# Patient Record
Sex: MALE | Race: WHITE | NOT HISPANIC OR LATINO | URBAN - METROPOLITAN AREA
[De-identification: names, ages, dates, MRNs, and addresses within clinical notes are randomized per-mention and may not be internally consistent; named-entity substitution may affect disease eponyms.]

---

## 2017-10-04 ENCOUNTER — INPATIENT (INPATIENT)
Facility: HOSPITAL | Age: 62
LOS: 5 days | Discharge: NURSING, REHAB OR LONG-TERM CARE | DRG: 65 | End: 2017-10-10
Attending: FAMILY MEDICINE | Admitting: HOSPITALIST
Payer: MEDICAID

## 2017-10-04 VITALS — WEIGHT: 196.21 LBS

## 2017-10-04 DIAGNOSIS — I63.9 CEREBRAL INFARCTION, UNSPECIFIED: ICD-10-CM

## 2017-10-04 LAB
ALBUMIN SERPL ELPH-MCNC: 4.6 G/DL — SIGNIFICANT CHANGE UP (ref 3.3–5.2)
ALP SERPL-CCNC: 102 U/L — SIGNIFICANT CHANGE UP (ref 40–120)
ALT FLD-CCNC: 16 U/L — SIGNIFICANT CHANGE UP
ANION GAP SERPL CALC-SCNC: 13 MMOL/L — SIGNIFICANT CHANGE UP (ref 5–17)
APTT BLD: 32.4 SEC — SIGNIFICANT CHANGE UP (ref 27.5–37.4)
AST SERPL-CCNC: 16 U/L — SIGNIFICANT CHANGE UP
BASOPHILS # BLD AUTO: 0 K/UL — SIGNIFICANT CHANGE UP (ref 0–0.2)
BASOPHILS NFR BLD AUTO: 0.2 % — SIGNIFICANT CHANGE UP (ref 0–2)
BILIRUB SERPL-MCNC: 0.2 MG/DL — LOW (ref 0.4–2)
BUN SERPL-MCNC: 12 MG/DL — SIGNIFICANT CHANGE UP (ref 8–20)
CALCIUM SERPL-MCNC: 10 MG/DL — SIGNIFICANT CHANGE UP (ref 8.6–10.2)
CHLORIDE SERPL-SCNC: 100 MMOL/L — SIGNIFICANT CHANGE UP (ref 98–107)
CO2 SERPL-SCNC: 27 MMOL/L — SIGNIFICANT CHANGE UP (ref 22–29)
CREAT SERPL-MCNC: 0.68 MG/DL — SIGNIFICANT CHANGE UP (ref 0.5–1.3)
EOSINOPHIL # BLD AUTO: 0.1 K/UL — SIGNIFICANT CHANGE UP (ref 0–0.5)
EOSINOPHIL NFR BLD AUTO: 1 % — SIGNIFICANT CHANGE UP (ref 0–5)
GLUCOSE SERPL-MCNC: 110 MG/DL — SIGNIFICANT CHANGE UP (ref 70–115)
HCT VFR BLD CALC: 43 % — SIGNIFICANT CHANGE UP (ref 42–52)
HGB BLD-MCNC: 14.9 G/DL — SIGNIFICANT CHANGE UP (ref 14–18)
INR BLD: 0.99 RATIO — SIGNIFICANT CHANGE UP (ref 0.88–1.16)
LYMPHOCYTES # BLD AUTO: 2.7 K/UL — SIGNIFICANT CHANGE UP (ref 1–4.8)
LYMPHOCYTES # BLD AUTO: 22.6 % — SIGNIFICANT CHANGE UP (ref 20–55)
MCHC RBC-ENTMCNC: 30.5 PG — SIGNIFICANT CHANGE UP (ref 27–31)
MCHC RBC-ENTMCNC: 34.7 G/DL — SIGNIFICANT CHANGE UP (ref 32–36)
MCV RBC AUTO: 87.9 FL — SIGNIFICANT CHANGE UP (ref 80–94)
MONOCYTES # BLD AUTO: 0.5 K/UL — SIGNIFICANT CHANGE UP (ref 0–0.8)
MONOCYTES NFR BLD AUTO: 4.1 % — SIGNIFICANT CHANGE UP (ref 3–10)
NEUTROPHILS # BLD AUTO: 8.5 K/UL — HIGH (ref 1.8–8)
NEUTROPHILS NFR BLD AUTO: 71.7 % — SIGNIFICANT CHANGE UP (ref 37–73)
PLATELET # BLD AUTO: 279 K/UL — SIGNIFICANT CHANGE UP (ref 150–400)
POTASSIUM SERPL-MCNC: 4 MMOL/L — SIGNIFICANT CHANGE UP (ref 3.5–5.3)
POTASSIUM SERPL-SCNC: 4 MMOL/L — SIGNIFICANT CHANGE UP (ref 3.5–5.3)
PROT SERPL-MCNC: 7.5 G/DL — SIGNIFICANT CHANGE UP (ref 6.6–8.7)
PROTHROM AB SERPL-ACNC: 10.9 SEC — SIGNIFICANT CHANGE UP (ref 9.8–12.7)
RBC # BLD: 4.89 M/UL — SIGNIFICANT CHANGE UP (ref 4.6–6.2)
RBC # FLD: 13.9 % — SIGNIFICANT CHANGE UP (ref 11–15.6)
SODIUM SERPL-SCNC: 140 MMOL/L — SIGNIFICANT CHANGE UP (ref 135–145)
TROPONIN T SERPL-MCNC: <0.01 NG/ML — SIGNIFICANT CHANGE UP (ref 0–0.06)
WBC # BLD: 11.9 K/UL — HIGH (ref 4.8–10.8)
WBC # FLD AUTO: 11.9 K/UL — HIGH (ref 4.8–10.8)

## 2017-10-04 PROCEDURE — 70496 CT ANGIOGRAPHY HEAD: CPT | Mod: 26

## 2017-10-04 PROCEDURE — 99291 CRITICAL CARE FIRST HOUR: CPT

## 2017-10-04 PROCEDURE — 70498 CT ANGIOGRAPHY NECK: CPT | Mod: 26

## 2017-10-04 PROCEDURE — 70450 CT HEAD/BRAIN W/O DYE: CPT | Mod: 26

## 2017-10-04 PROCEDURE — 93010 ELECTROCARDIOGRAM REPORT: CPT

## 2017-10-04 RX ORDER — ASPIRIN/CALCIUM CARB/MAGNESIUM 324 MG
325 TABLET ORAL ONCE
Qty: 0 | Refills: 0 | Status: COMPLETED | OUTPATIENT
Start: 2017-10-04 | End: 2017-10-04

## 2017-10-04 RX ORDER — ALTEPLASE 100 MG
72 KIT INTRAVENOUS ONCE
Qty: 0 | Refills: 0 | Status: DISCONTINUED | OUTPATIENT
Start: 2017-10-04 | End: 2017-10-04

## 2017-10-04 RX ORDER — LABETALOL HCL 100 MG
20 TABLET ORAL ONCE
Qty: 0 | Refills: 0 | Status: COMPLETED | OUTPATIENT
Start: 2017-10-04 | End: 2017-10-04

## 2017-10-04 RX ORDER — ALTEPLASE 100 MG
8 KIT INTRAVENOUS ONCE
Qty: 0 | Refills: 0 | Status: DISCONTINUED | OUTPATIENT
Start: 2017-10-04 | End: 2017-10-04

## 2017-10-04 RX ADMIN — Medication 20 MILLIGRAM(S): at 18:15

## 2017-10-04 RX ADMIN — Medication 325 MILLIGRAM(S): at 20:11

## 2017-10-04 RX ADMIN — Medication 20 MILLIGRAM(S): at 19:11

## 2017-10-04 NOTE — ED PROVIDER NOTE - OBJECTIVE STATEMENT
63 yo male with pmh of htn and etoh abuse sp recent detox in 4/2017 who presents with left sided weakness unknown exact time of onset but more than 6 hours ago probale 7 hours ago when questioning patient thus patient out of any tp a time frame.  Pt states  he had noticed weakness and numbness to his left leg which progressed to his left arm. Pt notes that if feels like his left sided of his body is asleep. denies fever. denies HA or neck pain. no chest pain or sob. no abd pain. no n/v/d. no urinary f/u/d. no back pain. denies illicit drug use. no recent travel. no rash. no other acute issues symptoms or concerns

## 2017-10-04 NOTE — ED ADULT NURSE NOTE - CHPI ED SYMPTOMS NEG
no blurred vision/no nausea/no vomiting/no dizziness/no loss of consciousness/no confusion/no fever/no change in level of consciousness

## 2017-10-04 NOTE — ED PROVIDER NOTE - CARE PLAN
Principal Discharge DX:	CVA (cerebral vascular accident) Principal Discharge DX:	CVA (cerebral vascular accident)  Secondary Diagnosis:	Hypertensive emergency

## 2017-10-04 NOTE — ED ADULT TRIAGE NOTE - CHIEF COMPLAINT QUOTE
States left leg became numb  at 2pm and arm is not starting to go numb. Dragging leg in triage. No facial asym present.

## 2017-10-04 NOTE — ED PROVIDER NOTE - CRITICAL CARE PROVIDED
direct patient care (not related to procedure)/documentation/interpretation of diagnostic studies/additional history taking

## 2017-10-04 NOTE — ED PROVIDER NOTE - MEDICAL DECISION MAKING DETAILS
pt presented eventually confirming tpa timeframe out of window was code stroke symtpoms more than 6 hours ago cta neg no interventional candidate per code stroke neuro dr amaya nned for emegent tx bp if possible tp acnadidate . need for multiple bedside reassessemnts neurovascular status.

## 2017-10-05 DIAGNOSIS — Z29.9 ENCOUNTER FOR PROPHYLACTIC MEASURES, UNSPECIFIED: ICD-10-CM

## 2017-10-05 DIAGNOSIS — I63.9 CEREBRAL INFARCTION, UNSPECIFIED: ICD-10-CM

## 2017-10-05 DIAGNOSIS — D72.829 ELEVATED WHITE BLOOD CELL COUNT, UNSPECIFIED: ICD-10-CM

## 2017-10-05 DIAGNOSIS — Z98.890 OTHER SPECIFIED POSTPROCEDURAL STATES: Chronic | ICD-10-CM

## 2017-10-05 DIAGNOSIS — I10 ESSENTIAL (PRIMARY) HYPERTENSION: ICD-10-CM

## 2017-10-05 LAB
CHOLEST SERPL-MCNC: 181 MG/DL — SIGNIFICANT CHANGE UP (ref 110–199)
HDLC SERPL-MCNC: 38 MG/DL — LOW
LIPID PNL WITH DIRECT LDL SERPL: 116 MG/DL — SIGNIFICANT CHANGE UP
TOTAL CHOLESTEROL/HDL RATIO MEASUREMENT: 5 RATIO — SIGNIFICANT CHANGE UP (ref 3.4–9.6)
TRIGL SERPL-MCNC: 137 MG/DL — SIGNIFICANT CHANGE UP (ref 10–200)

## 2017-10-05 PROCEDURE — 93880 EXTRACRANIAL BILAT STUDY: CPT | Mod: 26

## 2017-10-05 PROCEDURE — 70551 MRI BRAIN STEM W/O DYE: CPT | Mod: 26

## 2017-10-05 PROCEDURE — 99223 1ST HOSP IP/OBS HIGH 75: CPT

## 2017-10-05 PROCEDURE — 99222 1ST HOSP IP/OBS MODERATE 55: CPT

## 2017-10-05 RX ORDER — ATORVASTATIN CALCIUM 80 MG/1
80 TABLET, FILM COATED ORAL AT BEDTIME
Qty: 0 | Refills: 0 | Status: DISCONTINUED | OUTPATIENT
Start: 2017-10-05 | End: 2017-10-10

## 2017-10-05 RX ORDER — ASPIRIN/CALCIUM CARB/MAGNESIUM 324 MG
325 TABLET ORAL DAILY
Qty: 0 | Refills: 0 | Status: DISCONTINUED | OUTPATIENT
Start: 2017-10-05 | End: 2017-10-10

## 2017-10-05 RX ADMIN — ATORVASTATIN CALCIUM 80 MILLIGRAM(S): 80 TABLET, FILM COATED ORAL at 22:50

## 2017-10-05 RX ADMIN — Medication 325 MILLIGRAM(S): at 10:51

## 2017-10-05 NOTE — H&P ADULT - NSHPLABSRESULTS_GEN_ALL_CORE
LABS:    PT/INR - ( 04 Oct 2017 18:24 )   PT: 10.9 sec;   INR: 0.99 ratio    PTT - ( 04 Oct 2017 18:24 )  PTT:32.4 sec  LIVER FUNCTIONS - ( 04 Oct 2017 18:24 )  Alb: 4.6 g/dL / Pro: 7.5 g/dL / ALK PHOS: 102 U/L / ALT: 16 U/L / AST: 16 U/L / GGT: x                                 14.9   11.9  )-----------( 279      ( 04 Oct 2017 18:24 )             43.0     10-04    140  |  100  |  12.0  ----------------------------<  110  4.0   |  27.0  |  0.68    Ca    10.0      04 Oct 2017 18:24    TPro  7.5  /  Alb  4.6  /  TBili  0.2<L>  /  DBili  x   /  AST  16  /  ALT  16  /  AlkPhos  102  10-04    CARDIAC MARKERS ( 04 Oct 2017 18:24 )  x     / <0.01 ng/mL / x     / x     / x

## 2017-10-05 NOTE — ED ADULT NURSE REASSESSMENT NOTE - GENERAL PATIENT STATE
comfortable appearance/cooperative/family/SO at bedside
comfortable appearance/resting/sleeping
resting/sleeping

## 2017-10-05 NOTE — H&P ADULT - ASSESSMENT
Pt is a 61 yo male with pmh of htn and etoh abuse sp recent detox in 4/2017 who presents with left sided weakness. Pt states that at approx 12pm he had noticed weakness and numbness to his left leg which progressed to his left arm. Pt notes that if feels like his left sided of his body is asleep.  In the ED, pt was noted to be dragging his left foot.     -Left sided hemiparesis, r/o CVA.   Admit to stroke unit 4BRK.   neuro checks.   ct brain with no acute findings, mri ordered  echo ordered  us carotids ordered  neuro consult in am   cw asa   check lipid panel.     -Htn:  has not been on medication for 4 months as he ran out.  allow permissable htn in setting of possible acute cva.     -dvt ppx  lovenox sub q Pt is a 61 yo male with pmh of htn and etoh abuse sp recent detox in 4/2017 who presents with left sided weakness. Pt states that at approx 12pm he had noticed weakness and numbness to his left leg which progressed to his left arm. Pt notes that if feels like his left sided of his body is asleep.  In the ED, pt was noted to be dragging his left foot.     -Left sided hemiparesis, r/o CVA.   Admit to stroke unit 4BRK.   neuro checks.   ct brain with no acute findings, mri ordered  ct angiogram pending  echo ordered  us carotids ordered  neuro consult in am   cw asa   check lipid panel.     -Htn:  has not been on medication for 4 months as he ran out.  allow permissible htn in setting of possible acute cva.     -dvt ppx  lovenox sub q

## 2017-10-05 NOTE — H&P ADULT - NSHPREVIEWOFSYSTEMS_GEN_ALL_CORE
+left sided weakness and numbness  +difficulty walking  no chest pain   no sob  no nausea  no vomiting +left sided weakness and numbness  +difficulty walking  no chest pain   no sob  no nausea  no vomiting  no fever  no cerrato

## 2017-10-05 NOTE — CONSULT NOTE ADULT - ASSESSMENT
This is a 62-year-old gentleman with lacunar stroke. This is likely due to hypertension and smoking. Blood pressure control is advised with permission of hypertension for the next 24 hours. Tobacco cessation was also encouraged.   He will need physical and occupational therapy for the weakness.  ASA, statin   Carotid duplex and echocardiogram should be obtained as well.     I will follow along with you.    Geovanny Merritt MD, PhD   [id number]

## 2017-10-05 NOTE — H&P ADULT - NSHPPHYSICALEXAM_GEN_ALL_CORE
Patient is a 62y old  Male who presents with a chief complaint of left sided weakness and numbness (05 Oct 2017 01:54)  EXAM:  Vital Signs Last 24 Hrs  T(C): 36.9 (04 Oct 2017 20:11), Max: 36.9 (04 Oct 2017 18:12)  T(F): 98.4 (04 Oct 2017 20:11), Max: 98.5 (04 Oct 2017 18:12)  HR: 74 (04 Oct 2017 23:30) (70 - 95)  BP: 115/60 (04 Oct 2017 23:30) (115/60 - 214/106)  BP(mean): --  RR: 17 (04 Oct 2017 23:30) (16 - 19)  SpO2: 94% (04 Oct 2017 23:30) (94% - 100%)    10-04 @ 07:01  -  10-05 @ 04:15  --------------------------------------------------------  IN: 0 mL / OUT: 200 mL / NET: -200 mL        GENERAL NAD,   HEENT: NORMAL CEPHALIC ATRAUMATIC, EOMI, MOIST MUCUS MEMBRANES  NECK SUPPLE  CVS S1S2, RRR,   RESP BLCTA, NO RHONCHI  ABD SOFT, NON TENDER, NON DISTENDED  EXT NO EDEMA  NEURO MOVES ALL 4 EXTREMITES, LEFT SIDED HEMIPARESIS  TO 3/5. DECREASED SENSATION TO LEFT UPPER AND LOWER EXTREMITY. ABNORMAL FINGER TO NOSE TEST.   PSYCH APPROPRIATE MOOD  SKIN WARM, DRY

## 2017-10-05 NOTE — CONSULT NOTE ADULT - SUBJECTIVE AND OBJECTIVE BOX
[facility]                                     Neurology at [place]                                 Shaina Luna, & Roderick                                  [address]                                        [place], [place], [address]                                             [phone]    HISTORY:    This is a 62-year-old gentleman who came in to the hospital with complaints of left-sided weakness. Started yesterday. He presented on the time frame for TPA. The weakness albeit slightly better persists today. He did not complain of any numbness. The weakness is involving the arm and leg primarily with minimal facial involvement. Neurology evaluation is requested.    PAST MEDICAL & SURGICAL HISTORY:  HTN (hypertension)  H/O knee surgery  H/O left inguinal hernia repair      MEDICATIONS  (STANDING):  aspirin 325 milliGRAM(s) Oral daily  atorvastatin 80 milliGRAM(s) Oral at bedtime    MEDICATIONS  (PRN):      Allergies    No Known Allergies    Intolerances    None reported    SOCIAL HISTORY:  smoker, no alcohol use sober 5 months, no drug use    FAMILY HISTORY:  Family history of lung cancer (Father, Sibling)      ROS:  left-sided weakness  The patient denies fevers or weight changes.  Denies headache or dizziness.  Denies chest pain.  Denies shortness of breath.  Denies abdominal pain, nausea, or vomiting.  Denies change in urinary pattern.  Denies rash.  Denies recent mood changes.    Exam:  Vital Signs Last 24 Hrs  T(C): 36.6 (05 Oct 2017 10:59), Max: 37.1 (05 Oct 2017 08:04)  T(F): 97.9 (05 Oct 2017 10:59), Max: 98.7 (05 Oct 2017 08:04)  HR: 68 (05 Oct 2017 10:59) (68 - 95)  BP: 142/77 (05 Oct 2017 10:59) (115/60 - 214/106)  BP(mean): --  RR: 16 (05 Oct 2017 10:59) (16 - 20)  SpO2: 95% (05 Oct 2017 10:59) (94% - 100%)  General: NAD    Mental status: The patient is awake, alert, and fully oriented. There is no aphasia.    Cranial nerves: . Pupils react Symmetrically to light. There is no visual field deficit to confrontation. Extraocular motion is full with no nystagmus. There is no ptosis. Facial sensation is intact. Facial musculature is symmetric. Palate elevates symmetrically. Tongue is midline.    Motor: There is normal bulk and tone.  Strength is 5/5 in the right arm and leg.   Strength is 3-4/5 in the left arm and leg.    Sensation: Intact to light touch and pin.    Reflexes: 1-2+ throughout and plantar responses are flexor.    Cerebellar: There is no dysmetria on finger to nose testing.    LABS:                         14.9   11.9  )-----------( 279      ( 04 Oct 2017 18:24 )             43.0       10-04    140  |  100  |  12.0  ----------------------------<  110  4.0   |  27.0  |  0.68    Ca    10.0      04 Oct 2017 18:24    TPro  7.5  /  Alb  4.6  /  TBili  0.2<L>  /  DBili  x   /  AST  16  /  ALT  16  /  AlkPhos  102  10-04      PT/INR - ( 04 Oct 2017 18:24 )   PT: 10.9 sec;   INR: 0.99 ratio         PTT - ( 04 Oct 2017 18:24 )  PTT:32.4 sec    RADIOLOGY & ADDITIONAL STUDIES:  MRI of the brain showed restricted diffusion in the area of the right posterior limb of the internal capsule consistent with acute stroke. There was no blood or mass seen on the MRI of the brain.

## 2017-10-05 NOTE — H&P ADULT - HISTORY OF PRESENT ILLNESS
Pt is a 61 yo male with pmh of htn and etoh abuse sp recent detox in 4/2017 who presents with left sided weakness. Pt states that approx 12 pmh he had weakness and numbness to his left leg which progressed to his left arm. Pt notes that if feele like his left sided of his body is asleep.  Pt came to the ED and was noted to be dragging his left foot.     States that he had not taken his bp medications for 4 months. Pt is a 61 yo male with pmh of htn and etoh abuse sp recent detox in 4/2017 who presents with left sided weakness. Pt states that at approx 12pm he had noticed weakness and numbness to his left leg which progressed to his left arm. Pt notes that if feele like his left sided of his body is asleep.  Pt came to the ED and was noted to be dragging his left foot.     States that he had not taken his bp medications for 4 months. Pt is a 63 yo male with pmh of htn and etoh abuse sp recent detox in 4/2017 who presents with left sided weakness. Pt states that at approx 12pm he had noticed weakness and numbness to his left leg which progressed to his left arm. Pt notes that if feels like his left sided of his body is asleep.  In the ED, pt was noted to be dragging his left foot.     States that he has not taken his bp medications for 4 months.

## 2017-10-05 NOTE — ED ADULT NURSE REASSESSMENT NOTE - NS ED NURSE REASSESS COMMENT FT1
Pt resting comfortably on stretcher, A&Ox3. MD Guillen at bedside, pt and family updated on plan of care. Pt going to be admitted to [facility] for further workup. Pt remains hypertensive, MD aware, 20mg labetalol given IVP. Will continue to monitor and reassess closely.
pt. transported to MRI in NAD
pt. resting comfortably on stretcher.  No complaints of pain.  VSS.  NIH scale score 3. NSR on cardiac monitor.  Awaiting bed on 4 eliezer.  In no apparent distress, will continue to monitor.
pt. sleeping comfortably on stretcher.  No non verbal indicators of pain present.  VSS; NSR on cardiac monitor.  Awaiting bed on 4 eliezer.  In no apparent distress, will continue to monitor.
pt. sleeping comfortably on stretcher.  No non verbal indicators of pain present.  VSS; NSR on cardiac monitor.  Awaiting bed on 4 eliezer.  In no apparent distress, will continue to monitor.
Resumed care from Night RN alyx, Patient back from MRI. PT received explanation of plan of care for today. Numbness present lweft upper and left lower extremitiy, AAOx3, speech is clear. Plan for US of carotids. MD Ma aware of BP at this time, plan for PO meds for BP management
pt. resting comfortably on stretcher.  No complaints of pain.  Slight sensory loss noted to LLE; pt. continues to report numbness to LLE.  Pt. reporting numbness to left wrist hand and fingers.  Family at bedside.  Urinal offered to pt.  Will continue to monitor.

## 2017-10-06 PROCEDURE — 99231 SBSQ HOSP IP/OBS SF/LOW 25: CPT

## 2017-10-06 PROCEDURE — 99233 SBSQ HOSP IP/OBS HIGH 50: CPT

## 2017-10-06 RX ORDER — AMLODIPINE BESYLATE 2.5 MG/1
5 TABLET ORAL DAILY
Qty: 0 | Refills: 0 | Status: DISCONTINUED | OUTPATIENT
Start: 2017-10-06 | End: 2017-10-10

## 2017-10-06 RX ORDER — MECLIZINE HCL 12.5 MG
25 TABLET ORAL
Qty: 0 | Refills: 0 | Status: DISCONTINUED | OUTPATIENT
Start: 2017-10-06 | End: 2017-10-10

## 2017-10-06 RX ADMIN — Medication 325 MILLIGRAM(S): at 11:43

## 2017-10-06 RX ADMIN — ATORVASTATIN CALCIUM 80 MILLIGRAM(S): 80 TABLET, FILM COATED ORAL at 21:20

## 2017-10-06 RX ADMIN — Medication 25 MILLIGRAM(S): at 17:21

## 2017-10-07 PROCEDURE — 99231 SBSQ HOSP IP/OBS SF/LOW 25: CPT

## 2017-10-07 PROCEDURE — 99233 SBSQ HOSP IP/OBS HIGH 50: CPT

## 2017-10-07 RX ORDER — AMLODIPINE BESYLATE 2.5 MG/1
1 TABLET ORAL
Qty: 0 | Refills: 0 | COMMUNITY
Start: 2017-10-07

## 2017-10-07 RX ORDER — ASPIRIN/CALCIUM CARB/MAGNESIUM 324 MG
1 TABLET ORAL
Qty: 0 | Refills: 0 | COMMUNITY
Start: 2017-10-07

## 2017-10-07 RX ORDER — ATORVASTATIN CALCIUM 80 MG/1
1 TABLET, FILM COATED ORAL
Qty: 0 | Refills: 0 | COMMUNITY
Start: 2017-10-07

## 2017-10-07 RX ORDER — MECLIZINE HCL 12.5 MG
1 TABLET ORAL
Qty: 0 | Refills: 0 | COMMUNITY
Start: 2017-10-07

## 2017-10-07 RX ADMIN — ATORVASTATIN CALCIUM 80 MILLIGRAM(S): 80 TABLET, FILM COATED ORAL at 21:50

## 2017-10-07 RX ADMIN — Medication 325 MILLIGRAM(S): at 11:37

## 2017-10-07 RX ADMIN — AMLODIPINE BESYLATE 5 MILLIGRAM(S): 2.5 TABLET ORAL at 06:30

## 2017-10-08 ENCOUNTER — TRANSCRIPTION ENCOUNTER (OUTPATIENT)
Age: 62
End: 2017-10-08

## 2017-10-08 PROCEDURE — 99233 SBSQ HOSP IP/OBS HIGH 50: CPT

## 2017-10-08 RX ADMIN — ATORVASTATIN CALCIUM 80 MILLIGRAM(S): 80 TABLET, FILM COATED ORAL at 22:07

## 2017-10-08 RX ADMIN — AMLODIPINE BESYLATE 5 MILLIGRAM(S): 2.5 TABLET ORAL at 05:55

## 2017-10-08 RX ADMIN — Medication 325 MILLIGRAM(S): at 11:41

## 2017-10-08 NOTE — PHYSICAL THERAPY INITIAL EVALUATION ADULT - PERTINENT HX OF CURRENT PROBLEM, REHAB EVAL
Pt is a 63 yo male with pmh of htn and etoh abuse sp recent detox in 4/2017 who presents with left sided weakness; (+) CVA (Acute small right posterior internal capsule limb infarct.)

## 2017-10-08 NOTE — DISCHARGE NOTE ADULT - PROVIDER TOKENS
DEBRA:'[id number]' TOKEN:'[id number]',TOKEN:'[id number]' TOKEN:'[id number]:'[id number]',FREE:[LAST:[True],FIRST:[Ana],PHONE:[(   )    -],FAX:[(   )    -]]

## 2017-10-08 NOTE — DISCHARGE NOTE ADULT - CARE PLAN
Principal Discharge DX:	Cerebrovascular accident (CVA), unspecified mechanism  Goal:	optimal rehab for left side weakness  Instructions for follow-up, activity and diet:	low salt diet, quit smoking  Secondary Diagnosis:	Essential hypertension  Secondary Diagnosis:	H/O knee surgery  Secondary Diagnosis:	H/O left inguinal hernia repair Principal Discharge DX:	Cerebrovascular accident (CVA), unspecified mechanism  Goal:	optimal rehab for left side weakness  Instructions for follow-up, activity and diet:	low salt diet, quit smoking, no ETOH, take medications, f/u LFTs in 1 month, see vascular surgery as outpt  Secondary Diagnosis:	Essential hypertension  Instructions for follow-up, activity and diet:	norvasc  Secondary Diagnosis:	H/O knee surgery  Secondary Diagnosis:	H/O left inguinal hernia repair

## 2017-10-08 NOTE — PHYSICAL THERAPY INITIAL EVALUATION ADULT - ADDITIONAL COMMENTS
Pt. lives in a house in a single room with 14 other men. No stairs to enter and 5+5 inside with one rail and platform between. Chairlift on stairs available. Pt. does not own DME and was independent PTA.

## 2017-10-08 NOTE — PHYSICAL THERAPY INITIAL EVALUATION ADULT - CRITERIA FOR SKILLED THERAPEUTIC INTERVENTIONS
anticipated discharge recommendation/risk reduction/prevention/impairments found/functional limitations in following categories

## 2017-10-08 NOTE — DISCHARGE NOTE ADULT - CARE PROVIDER_API CALL
Lionel Bunn), Neurology; Vascular Neurology  [address] [place] [address]  Phone: [phone]  Fax: [phone] Lionel Bunn), Neurology; Vascular Neurology  [address]  [address]  [place] [address]  Phone: [phone]  Fax: [phone]    Kyle Da Silva (MD), Surgery; Vascular Surgery  [address]  [address]st [place] [address]  Phone: [phone]  Fax: [phone] Lionel Bunn), Neurology; Vascular Neurology  [address]  [address]  [place] [address]  Phone: [phone]  Fax: [phone]    Kyle Da Silva (MD), Surgery; Vascular Surgery  [address]  [address]st [place] [address]  Phone: [phone]  Fax: [phone]    Ana Biswas  Phone: (   )    -  Fax: (   )    -

## 2017-10-08 NOTE — PHYSICAL THERAPY INITIAL EVALUATION ADULT - GAIT DEVIATIONS NOTED, PT EVAL
intermittent minimal assist due to LOB. pt. requiring cues to clear left foot. improved with visual feedback/cues.

## 2017-10-08 NOTE — DISCHARGE NOTE ADULT - PATIENT PORTAL LINK FT
“You can access the Follow[employer] Patient Portal, offered by [facility], by registering with the following website: [email or web address]”

## 2017-10-08 NOTE — DISCHARGE NOTE ADULT - HOSPITAL COURSE
HPI:  Pt is a 63 yo male with pmh of htn and etoh abuse sp recent detox in 4/2017 who presents with left sided weakness. Pt states that at approx 12pm he had noticed weakness and numbness to his left leg which progressed to his left arm. Pt notes that if feels like his left sided of his body is asleep.  In the ED, pt was noted to be dragging his left foot.   States that he has not taken his bp medications for 4 months. (05 Oct 2017 01:54)  < from: MRI Head w/o Cont (10.05.17 @ 08:03) >    IMPRESSION: Acute small right posterior internal capsule limb infarct.  < from: US Duplex Carotid Arteries Complete, Bilateral (10.05.17 @ 18:46) >  IMPRESSION:    Mild to moderate bilateral plaque.    50-69% stenosis right proximal ICA.    Measurement of carotid stenosis is based on velocity parameters that   correlate the residual internal carotid diameter with that of the more   distal vessel in accordance with a method such as the North American   Symptomatic Carotid Endarterectomy Trial (NASCET).      GENERAL: NAD, well-groomed, well-developed  HEAD:  Atraumatic, Normocephalic  EYES: EOMI, PERRL, conjunctiva and sclera clear  ENMT: No tonsillar erythema, exudates, or enlargement; Moist mucous membranes, Good dentition, No lesions  NECK: Supple, No JVD, Normal thyroid  NERVOUS SYSTEM:  Alert & Oriented X3, Good concentration; Motor Strength 5/5 right side upper and lower extremities; left side 4/5   CHEST/LUNG: Clear to percussion bilaterally; No rales, rhonchi, wheezing, or rubs  HEART: Regular rate and rhythm; No murmurs, rubs, or gallops  ABDOMEN: Soft, Nontender, Nondistended; Bowel sounds present  EXTREMITIES:  2+ Peripheral Pulses, No clubbing, cyanosis, or edema  LYMPH: No lymphadenopathy noted  SKIN: No rashes or lesions HPI:  Pt is a 63 yo male with pmh of htn and etoh abuse sp recent detox in 4/2017 who presents with left sided weakness. Pt states that at approx 12pm he had noticed weakness and numbness to his left leg which progressed to his left arm. Pt notes that if feels like his left sided of his body is asleep.  In the ED, pt was noted to be dragging his left foot.   States that he has not taken his bp medications for 4 months. (05 Oct 2017 01:54)  < from: MRI Head w/o Cont (10.05.17 @ 08:03) >    IMPRESSION: Acute small right posterior internal capsule limb infarct.  < from: US Duplex Carotid Arteries Complete, Bilateral (10.05.17 @ 18:46) >  IMPRESSION:  Mild to moderate bilateral plaque.  50-69% stenosis right proximal ICA.- out patient follow up with Dr clements- vascular surgery   Measurement of carotid stenosis is based on velocity parameters that   correlate the residual internal carotid diameter with that of the more   distal vessel in accordance with a method such as the North American   Symptomatic Carotid Endarterectomy Trial (NASCET).      GENERAL: NAD, well-groomed, well-developed  HEAD:  Atraumatic, Normocephalic  EYES: EOMI, PERRL, conjunctiva and sclera clear  ENMT: No tonsillar erythema, exudates, or enlargement; Moist mucous membranes, Good dentition, No lesions  NECK: Supple, No JVD, Normal thyroid  NERVOUS SYSTEM:  Alert & Oriented X3, Good concentration; Motor Strength 5/5 right side upper and lower extremities; left side 4/5 patient walking with minimal noticeable limp  CHEST/LUNG: Clear to percussion bilaterally; No rales, rhonchi, wheezing, or rubs  HEART: Regular rate and rhythm; No murmurs, rubs, or gallops  ABDOMEN: Soft, Nontender, Nondistended; Bowel sounds present  EXTREMITIES:  2+ Peripheral Pulses, No clubbing, cyanosis, or edema  LYMPH: No lymphadenopathy noted  SKIN: No rashes or lesions 62 y.o. male with Hx of HTN, GERD, ETOH abuse sober house resident p/w left sided weakness - right IC CVA acute with DARLENE 50-69% stenosis.     Problem/Plan - 1:  ·  Problem: Cerebrovascular accident (CVA) due to stenosis of right carotid artery.  Plan: in right IC with DARLENE 50-69% stenosis - clinically improved, able to ambulate with walker, + unsteady gait and paresthesias with minimal LUE/LLE weakness  - discussed with vascular - evaluate prior to DC as current recommendation for symptomatic ASD of CA is CEA within 2 weeks of CVA - if no surgery recommended continue medical management  - cont  mg + lipitor 80 mg - f/u LFTs in 1 month.      Problem/Plan - 2:  ·  Problem: Hypertension, unspecified type.  Plan: on norvasc 5 mg.      Problem/Plan - 3:  ·  Problem: Gastroesophageal reflux disease, esophagitis presence not specified.  Plan: resume PPI as outpt.

## 2017-10-08 NOTE — DISCHARGE NOTE ADULT - PLAN OF CARE
optimal rehab for left side weakness low salt diet, quit smoking low salt diet, quit smoking, no ETOH, take medications, f/u LFTs in 1 month, see vascular surgery as outpt norvasc

## 2017-10-08 NOTE — DISCHARGE NOTE ADULT - CARE PROVIDERS DIRECT ADDRESSES
,DirectAddress_Unknown ,DirectAddress_Unknown,[email or web address] ,DirectAddress_Unknown,[email or web address],DirectAddress_Unknown

## 2017-10-08 NOTE — DISCHARGE NOTE ADULT - MEDICATION SUMMARY - MEDICATIONS TO TAKE
I will START or STAY ON the medications listed below when I get home from the hospital:    aspirin 325 mg oral tablet  -- 1 tab(s) by mouth once a day  -- Indication: For CVA (cerebral vascular accident)    meclizine 25 mg oral tablet  -- 1 tab(s) by mouth 2 times a day, As needed, Dizziness  -- Indication: For CVA (cerebral vascular accident)    atorvastatin 80 mg oral tablet  -- 1 tab(s) by mouth once a day (at bedtime)  -- Indication: For CVA (cerebral vascular accident)    amLODIPine 5 mg oral tablet  -- 1 tab(s) by mouth once a day  -- Indication: For HTN (hypertension) I will START or STAY ON the medications listed below when I get home from the hospital:    aspirin 325 mg oral tablet  -- 1 tab(s) by mouth once a day  -- Indication: For Cerebral infarction    meclizine 25 mg oral tablet  -- 1 tab(s) by mouth 2 times a day, As needed, Dizziness MDD:2  -- Indication: For Cerebral infarction    meclizine 25 mg oral tablet  -- 1 tab(s) by mouth 2 times a day, As needed, Dizziness  -- Indication: For CVA (cerebral vascular accident)    atorvastatin 80 mg oral tablet  -- 1 tab(s) by mouth once a day (at bedtime) MDD:1  -- Indication: For Cerebral infarction    amLODIPine 5 mg oral tablet  -- 1 tab(s) by mouth once a day MDD:1  -- Indication: For HTN (hypertension) I will START or STAY ON the medications listed below when I get home from the hospital:    aspirin 325 mg oral tablet  -- 1 tab(s) by mouth once a day  -- Indication: For Cerebral infarction    meclizine 25 mg oral tablet  -- 1 tab(s) by mouth 2 times a day, As needed, Dizziness MDD:2  -- Indication: For Cerebral infarction    atorvastatin 80 mg oral tablet  -- 1 tab(s) by mouth once a day (at bedtime) MDD:1  -- Indication: For Cerebral infarction    amLODIPine 5 mg oral tablet  -- 1 tab(s) by mouth once a day MDD:1  -- Indication: For HTN (hypertension)

## 2017-10-08 NOTE — PHYSICAL THERAPY INITIAL EVALUATION ADULT - COORDINATION ASSESSED, REHAB EVAL
heel to shin/finger to nose/right UE intact, left extremities impaired (mildly dysmetric and slowed) right UE intact, left extremities impaired (mildly dysmetric and slowed)/finger to nose/heel to shin

## 2017-10-09 PROCEDURE — 99239 HOSP IP/OBS DSCHRG MGMT >30: CPT

## 2017-10-09 RX ORDER — ASPIRIN/CALCIUM CARB/MAGNESIUM 324 MG
1 TABLET ORAL
Qty: 30 | Refills: 0
Start: 2017-10-09 | End: 2017-11-08

## 2017-10-09 RX ORDER — AMLODIPINE BESYLATE 2.5 MG/1
1 TABLET ORAL
Qty: 30 | Refills: 0
Start: 2017-10-09 | End: 2017-11-08

## 2017-10-09 RX ORDER — ATORVASTATIN CALCIUM 80 MG/1
1 TABLET, FILM COATED ORAL
Qty: 30 | Refills: 0
Start: 2017-10-09 | End: 2017-11-08

## 2017-10-09 RX ORDER — MECLIZINE HCL 12.5 MG
1 TABLET ORAL
Qty: 60 | Refills: 0
Start: 2017-10-09 | End: 2017-11-08

## 2017-10-09 RX ADMIN — AMLODIPINE BESYLATE 5 MILLIGRAM(S): 2.5 TABLET ORAL at 06:17

## 2017-10-09 RX ADMIN — Medication 325 MILLIGRAM(S): at 12:22

## 2017-10-09 RX ADMIN — ATORVASTATIN CALCIUM 80 MILLIGRAM(S): 80 TABLET, FILM COATED ORAL at 21:53

## 2017-10-09 NOTE — PROGRESS NOTE ADULT - PROBLEM SELECTOR PLAN 4
DVT ppx- Lovenox  Hypercholesterolemia- Lipitor 80mg PO QHS  LFTs wnl  Smoking - Preventive education  will need to discuss again to benefit from Nicotine patch  GI prx- Protonix 40mg PO QD  Supplements: MVI, Folic acid, Thiamine
DVT ppx- oob and ambulating active   Hypercholesterolemia- Lipitor  PO QHS  LFTs wnl  Smoking - Preventive education   discussed again   GI prx- Protonix 40mg PO QD  Supplements: MVI, Folic acid, Thiamine
DVT prx- Lovenox  Hypercholesterolemia- Lipitor 80mg PO QHS  LFTs wnl  Smoking - Preventive education  will need to discuss again to benefit from Nicotine patch  GI prx- Protonix 40mg PO QD  Supplements: MVI, Folic acid, Thiamine
DVT prx- Lovenox  Hypercholesterolemia- Lipitor 80mg PO QHS  LFTs wnl  Smoking - Preventive education  will need to discuss again to benefit from Nicotine patch  GI prx- Protonix 40mg PO QD  Supplements: MVI, Folic acid, Thiamine

## 2017-10-10 VITALS
HEART RATE: 68 BPM | RESPIRATION RATE: 18 BRPM | DIASTOLIC BLOOD PRESSURE: 78 MMHG | SYSTOLIC BLOOD PRESSURE: 137 MMHG | OXYGEN SATURATION: 98 % | TEMPERATURE: 99 F

## 2017-10-10 DIAGNOSIS — K21.9 GASTRO-ESOPHAGEAL REFLUX DISEASE WITHOUT ESOPHAGITIS: ICD-10-CM

## 2017-10-10 DIAGNOSIS — I63.231 CEREBRAL INFARCTION DUE TO UNSPECIFIED OCCLUSION OR STENOSIS OF RIGHT CAROTID ARTERIES: ICD-10-CM

## 2017-10-10 DIAGNOSIS — I10 ESSENTIAL (PRIMARY) HYPERTENSION: ICD-10-CM

## 2017-10-10 PROCEDURE — 99291 CRITICAL CARE FIRST HOUR: CPT | Mod: 25

## 2017-10-10 PROCEDURE — 96376 TX/PRO/DX INJ SAME DRUG ADON: CPT | Mod: XU

## 2017-10-10 PROCEDURE — 85730 THROMBOPLASTIN TIME PARTIAL: CPT

## 2017-10-10 PROCEDURE — 84484 ASSAY OF TROPONIN QUANT: CPT

## 2017-10-10 PROCEDURE — 93005 ELECTROCARDIOGRAM TRACING: CPT

## 2017-10-10 PROCEDURE — 36415 COLL VENOUS BLD VENIPUNCTURE: CPT

## 2017-10-10 PROCEDURE — 70450 CT HEAD/BRAIN W/O DYE: CPT

## 2017-10-10 PROCEDURE — 85027 COMPLETE CBC AUTOMATED: CPT

## 2017-10-10 PROCEDURE — 93880 EXTRACRANIAL BILAT STUDY: CPT

## 2017-10-10 PROCEDURE — 70498 CT ANGIOGRAPHY NECK: CPT

## 2017-10-10 PROCEDURE — 85610 PROTHROMBIN TIME: CPT

## 2017-10-10 PROCEDURE — 80053 COMPREHEN METABOLIC PANEL: CPT

## 2017-10-10 PROCEDURE — 93306 TTE W/DOPPLER COMPLETE: CPT

## 2017-10-10 PROCEDURE — 97163 PT EVAL HIGH COMPLEX 45 MIN: CPT

## 2017-10-10 PROCEDURE — 96374 THER/PROPH/DIAG INJ IV PUSH: CPT | Mod: XU

## 2017-10-10 PROCEDURE — 70551 MRI BRAIN STEM W/O DYE: CPT

## 2017-10-10 PROCEDURE — 70496 CT ANGIOGRAPHY HEAD: CPT

## 2017-10-10 PROCEDURE — 97167 OT EVAL HIGH COMPLEX 60 MIN: CPT

## 2017-10-10 PROCEDURE — 99239 HOSP IP/OBS DSCHRG MGMT >30: CPT

## 2017-10-10 PROCEDURE — 97530 THERAPEUTIC ACTIVITIES: CPT

## 2017-10-10 PROCEDURE — 80061 LIPID PANEL: CPT

## 2017-10-10 PROCEDURE — 97110 THERAPEUTIC EXERCISES: CPT

## 2017-10-10 PROCEDURE — 97116 GAIT TRAINING THERAPY: CPT

## 2017-10-10 RX ADMIN — Medication 325 MILLIGRAM(S): at 12:14

## 2017-10-10 RX ADMIN — AMLODIPINE BESYLATE 5 MILLIGRAM(S): 2.5 TABLET ORAL at 05:55

## 2017-10-10 NOTE — PROGRESS NOTE ADULT - PROBLEM SELECTOR PLAN 3
resume PPI as outpt
possibly reactive
possibly reactive  CXR (Hx: smoking)  ua & c&s

## 2017-10-10 NOTE — PROGRESS NOTE ADULT - PROBLEM SELECTOR PLAN 1
Admit to stroke unit [facility]  -- MRI Head w/o Cont (10.05.17 @ 08:03) >  Acute small right posterior internal capsule limb infarct.  --Neuro checks  --Neurology Consult  echo ordered  out patient vascul;ar follow up for the carotid stenosis, no intervention at this time with acute CVA   < from: CT Angio Neck w/ IV Cont (10.04.17 @ 18:53) >  50% stenosis in the proximal right internal carotid artery.  -- Vascular Surgery Consult    Cholesterol, Serum: 181 mg/dL   ASA 325mg PO QD  Hypercholesterolemia- Lipitor 80mg PO QHS
await PT eval   -- MRI Head w/o Cont (10.05.17 @ 08:03) >  Acute small right posterior internal capsule limb infarct.  --Neuro checks   --Neurology Consult appreciated   out patient vascular follow up for the carotid stenosis, no intervention at this time with acute CVA - discussed with patient   < from: CT Angio Neck w/ IV Cont (10.04.17 @ 18:53) >  50% stenosis in the proximal right internal carotid artery.  -- Vascular Surgery Consult- out patient   Cholesterol, Serum: 181 mg/dL   ASA 325mg PO QD  Hypercholesterolemia- Lipitor 80mg PO QHS
out patient rehab per PT eval   -- MRI Head w/o Cont (10.05.17 @ 08:03) >  Acute small right posterior internal capsule limb infarct.  --Neurology and out patient vascular follow up for the carotid stenosis, no intervention at this time with acute CVA - discussed with patient   < from: CT Angio Neck w/ IV Cont (10.04.17 @ 18:53) >  50% stenosis in the proximal right internal carotid artery.  -- Vascular Surgery Consult- out patient   Cholesterol, Serum: 181 mg/dL   ASA 325mg PO QD  Hypercholesterolemia- Lipitor 80mg PO QHS
in right IC with DARLENE 50-69% stenosis - clinically improved, able to ambulate with walker, + unsteady gait and paresthesias with minimal LUE/LLE weakness  - discussed with vascular - evaluate prior to DC as current recommendation for symptomatic ASD of CA is CEA within 2 weeks of CVA  - cont  mg + lipitor 80 mg - f/u LFTs in 1 month
Admit to stroke unit [facility]  -- MRI Head w/o Cont (10.05.17 @ 08:03) >  Acute small right posterior internal capsule limb infarct.  --Neuro checks  --Neurology Consult    echo ordered    < from: CT Angio Neck w/ IV Cont (10.04.17 @ 18:53) >  50% stenosis in the proximal right internal carotid artery.  -- Vascular Surgery Consult    Cholesterol, Serum: 181 mg/dL   ASA 325mg PO QD  Hypercholesterolemia- Lipitor 80mg PO QHS

## 2017-10-10 NOTE — PROGRESS NOTE ADULT - PROBLEM SELECTOR PLAN 2
on norvasc 5 mg
DASH/TLC  trop<0.01  Might need Norvasc 5mg PO QD w/parameters
DASH/TLC  trop<0.01  Might need Norvasc 5mg PO QD w/parameters
Norvasc
DASH/TLC  trop<0.01  Might need Norvasc 5mg PO QD w/parameters

## 2017-10-10 NOTE — PROGRESS NOTE ADULT - PROBLEM SELECTOR PROBLEM 2
HTN (hypertension)
Hypertension, unspecified type
HTN (hypertension)

## 2017-10-10 NOTE — PROGRESS NOTE ADULT - PROBLEM SELECTOR PROBLEM 3
Gastroesophageal reflux disease, esophagitis presence not specified
Leukocytosis

## 2017-10-10 NOTE — PROGRESS NOTE ADULT - SUBJECTIVE AND OBJECTIVE BOX
[facility]                                        Neurology at [place]                                 Shaina Luna, & Roderick                                  [address]                                        [place], [place], [address]                                             [phone]        No new complaints.    Vital Signs Last 24 Hrs  T(F): 98.3 (07 Oct 2017 10:00), Max: 99.2 (06 Oct 2017 14:00)  HR: 80 (07 Oct 2017 12:00) (55 - 86)  BP: 142/90 (07 Oct 2017 12:00) (125/80 - 165/81)  BP(mean): 110 (07 Oct 2017 12:00) (93 - 115)  RR: 18 (07 Oct 2017 12:00) (17 - 39)  SpO2: 96% (07 Oct 2017 12:00) (91% - 98%)    Awake and alert.  Pupils react.  Face symmetric.  Left hemiparesis persists.
[facility]                                        Neurology at [place]                                 Shaina Luna, & Roderick                                  [address]                                        [place], [place], [address]                                             [phone]        Reports left side unchanged.   Has some dizziness.     Vital Signs Last 24 Hrs  T(F): 98 (06 Oct 2017 05:00), Max: 98.9 (05 Oct 2017 15:00)  HR: 79 (06 Oct 2017 09:00) (52 - 92)  BP: 134/86 (06 Oct 2017 09:00) (112/67 - 178/84)  BP(mean): 102 (06 Oct 2017 09:00) (79 - 121)  RR: 21 (06 Oct 2017 09:00) (16 - 29)  SpO2: 91% (06 Oct 2017 09:00) (91% - 98%)    Awake and alert.  Pupils react.  Face symmetric.  Left hemiparesis persists.
MARIANO REEVES Patient is a 62y old  Male who presents with a chief complaint of left sided weakness and numbness (05 Oct 2017 01:54)     HPI:  Pt is a 61 yo male with pmh of htn and etoh abuse sp recent detox in 4/2017 who presents with left sided weakness. Pt states that at approx 12pm he had noticed weakness and numbness to his left leg which progressed to his left arm. Pt notes that if feels like his left sided of his body is asleep.  In the ED, pt was noted to be dragging his left foot.     States that he has not taken his bp medications for 4 months. (05 Oct 2017 01:54)    The patient was seen and evaluated with CVA  The patient is in no acute distress.  Denied any fever chest pain, palpitations, shortness of breath, abdominal pain, fever, dysuria, cough, edema   Complains of left side wekness    Height (cm): 180.3 (10-06 @ 05:50)I&O's Summary    05 Oct 2017 07:01  -  06 Oct 2017 07:00  --------------------------------------------------------  IN: 240 mL / OUT: 1000 mL / NET: -760 mL    06 Oct 2017 07:01  -  06 Oct 2017 16:12  --------------------------------------------------------  IN: 480 mL / OUT: 775 mL / NET: -295 mL      Allergies    No Known Allergies    Intolerances      HEALTH ISSUES - PROBLEM Dx:  Preventive measure: Preventive measure  Leukocytosis: Leukocytosis  HTN (hypertension): HTN (hypertension)  CVA (cerebral vascular accident): CVA (cerebral vascular accident)        PAST MEDICAL & SURGICAL HISTORY:  HTN (hypertension)  H/O knee surgery  H/O left inguinal hernia repair          Vital Signs Last 24 Hrs  T(C): 37.3 (06 Oct 2017 14:00), Max: 37.3 (06 Oct 2017 14:00)  T(F): 99.2 (06 Oct 2017 14:00), Max: 99.2 (06 Oct 2017 14:00)  HR: 67 (06 Oct 2017 15:00) (52 - 92)  BP: 141/84 (06 Oct 2017 15:00) (112/67 - 178/84)  BP(mean): 107 (06 Oct 2017 15:00) (79 - 121)  RR: 23 (06 Oct 2017 15:00) (16 - 30)  SpO2: 95% (06 Oct 2017 15:00) (91% - 98%)T(C): 37.3 (10-06-17 @ 14:00), Max: 37.3 (10-06-17 @ 14:00)  HR: 67 (10-06-17 @ 15:00) (52 - 92)  BP: 141/84 (10-06-17 @ 15:00) (112/67 - 178/84)  RR: 23 (10-06-17 @ 15:00) (16 - 30)  SpO2: 95% (10-06-17 @ 15:00) (91% - 98%)  Wt(kg): --    PHYSICAL EXAM:    GENERAL: NAD, well-groomed, well-developed  HEAD:  Atraumatic, Normocephalic  EYES: EOMI, PERRLA, conjunctiva and sclera clear  ENMT:  Moist mucous membranes,  No lesions  NECK: Supple, No JVD, Normal thyroid  NERVOUS SYSTEM:  Alert & Oriented X3,  Moves upper and lower extremities; CNS-II-XII, left leg 4/5 and left arm some numbness  CHEST/LUNG: Clear to auscultation bilaterally; No rales, rhonchi, wheezing,   HEART: Regular rate and rhythm; No murmurs,   ABDOMEN: Soft, Nontender, Nondistended; Bowel sounds present  EXTREMITIES:  Peripheral Pulses, No  cyanosis, or edema  SKIN: No rashes or lesions  psychiatry- mood and affect approprite, Insight and judgement intact     amLODIPine   Tablet 5 milliGRAM(s) Oral daily  aspirin 325 milliGRAM(s) Oral daily  atorvastatin 80 milliGRAM(s) Oral at bedtime      LABS:                          14.9   11.9  )-----------( 279      ( 04 Oct 2017 18:24 )             43.0     10-04    140  |  100  |  12.0  ----------------------------<  110  4.0   |  27.0  |  0.68    Ca    10.0      04 Oct 2017 18:24    TPro  7.5  /  Alb  4.6  /  TBili  0.2<L>  /  DBili  x   /  AST  16  /  ALT  16  /  AlkPhos  102  10-04    LIVER FUNCTIONS - ( 04 Oct 2017 18:24 )  Alb: 4.6 g/dL / Pro: 7.5 g/dL / ALK PHOS: 102 U/L / ALT: 16 U/L / AST: 16 U/L / GGT: x           PT/INR - ( 04 Oct 2017 18:24 )   PT: 10.9 sec;   INR: 0.99 ratio         PTT - ( 04 Oct 2017 18:24 )  PTT:32.4 sec  CARDIAC MARKERS ( 04 Oct 2017 18:24 )  x     / <0.01 ng/mL / x     / x     / x            CAPILLARY BLOOD GLUCOSE          RADIOLOGY & ADDITIONAL TESTS:      Consultant notes reviewed    Case discussed with consultant/provider/ family /patient
MARIANO REEVES Patient is a 62y old  Male who presents with a chief complaint of left sided weakness and numbness (05 Oct 2017 01:54)     HPI:  Pt is a 63 yo male with pmh of htn and etoh abuse sp recent detox in 4/2017 who presents with left sided weakness. Pt states that at approx 12pm he had noticed weakness and numbness to his left leg which progressed to his left arm. Pt notes that if feels like his left sided of his body is asleep.  In the ED, pt was noted to be dragging his left foot.     States that he has not taken his bp medications for 4 months. (05 Oct 2017 01:54)    The patient was seen and evaluated CVA   The patient is in no acute distress.  Denied any fever chest pain, palpitations, shortness of breath, abdominal pain, fever, dysuria, cough, edema   Complains of left side weakness and dizziness     I&O's Summary    06 Oct 2017 07:01  -  07 Oct 2017 07:00  --------------------------------------------------------  IN: 480 mL / OUT: 1700 mL / NET: -1220 mL    07 Oct 2017 07:01  -  07 Oct 2017 13:08  --------------------------------------------------------  IN: 240 mL / OUT: 1175 mL / NET: -935 mL      Allergies    No Known Allergies    Intolerances      HEALTH ISSUES - PROBLEM Dx:  Preventive measure: Preventive measure  Leukocytosis: Leukocytosis  HTN (hypertension): HTN (hypertension)  CVA (cerebral vascular accident): CVA (cerebral vascular accident)        PAST MEDICAL & SURGICAL HISTORY:  HTN (hypertension)  H/O knee surgery  H/O left inguinal hernia repair          Vital Signs Last 24 Hrs  T(C): 36.8 (07 Oct 2017 10:00), Max: 37.3 (06 Oct 2017 14:00)  T(F): 98.3 (07 Oct 2017 10:00), Max: 99.2 (06 Oct 2017 14:00)  HR: 80 (07 Oct 2017 12:00) (55 - 86)  BP: 142/90 (07 Oct 2017 12:00) (125/80 - 165/81)  BP(mean): 110 (07 Oct 2017 12:00) (93 - 115)  RR: 18 (07 Oct 2017 12:00) (17 - 39)  SpO2: 96% (07 Oct 2017 12:00) (91% - 98%)T(C): 36.8 (10-07-17 @ 10:00), Max: 37.3 (10-06-17 @ 14:00)  HR: 80 (10-07-17 @ 12:00) (55 - 86)  BP: 142/90 (10-07-17 @ 12:00) (125/80 - 165/81)  RR: 18 (10-07-17 @ 12:00) (17 - 39)  SpO2: 96% (10-07-17 @ 12:00) (91% - 98%)  Wt(kg): --    PHYSICAL EXAM:    GENERAL: NAD, well-groomed, well-developed  HEAD:  Atraumatic, Normocephalic  EYES: EOMI,  conjunctiva and sclera clear  ENMT:  Moist mucous membranes,  No lesions  NECK: Supple, No JVD, Normal thyroid  NERVOUS SYSTEM:  Alert & Oriented X3,  Moves upper and lower extremities, left leg wekness; CNS-II-XII  CHEST/LUNG: Clear to auscultation bilaterally; No rales, rhonchi, wheezing,   HEART: Regular rate and rhythm; No murmurs,   ABDOMEN: Soft, Nontender, Nondistended; Bowel sounds present  EXTREMITIES:  Peripheral Pulses, No  cyanosis, or edema  SKIN: No rashes or lesions  psychiatry- mood and affect appropriate Insight and judgement intact     amLODIPine   Tablet 5 milliGRAM(s) Oral daily  aspirin 325 milliGRAM(s) Oral daily  atorvastatin 80 milliGRAM(s) Oral at bedtime  meclizine 25 milliGRAM(s) Oral two times a day PRN      LABS:                      CAPILLARY BLOOD GLUCOSE          RADIOLOGY & ADDITIONAL TESTS:      Consultant notes reviewed    Case discussed with consultant/provider/ family /patient
MARIANO REEVES Patient is a 62y old  Male who presents with a chief complaint of left sided weakness and numbness (08 Oct 2017 13:07)     HPI:  Pt is a 63 yo male with pmh of htn and etoh abuse sp recent detox in 4/2017 who presents with left sided weakness. Pt states that at approx 12pm he had noticed weakness and numbness to his left leg which progressed to his left arm. Pt notes that if feels like his left sided of his body is asleep.  In the ED, pt was noted to be dragging his left foot.     States that he has not taken his bp medications for 4 months. (05 Oct 2017 01:54)    The patient was seen and evaluated CVA   The patient is in no acute distress.  Denied any fever chest pain, palpitations, shortness of breath, abdominal pain, fever, dysuria, cough, edema   Complains of left side slight weakness     I&O's Summary    Allergies    No Known Allergies    Intolerances      HEALTH ISSUES - PROBLEM Dx:  Preventive measure: Preventive measure  Leukocytosis: Leukocytosis  HTN (hypertension): HTN (hypertension)  CVA (cerebral vascular accident): CVA (cerebral vascular accident)        PAST MEDICAL & SURGICAL HISTORY:  HTN (hypertension)  H/O knee surgery  H/O left inguinal hernia repair          Vital Signs Last 24 Hrs  T(C): 37.2 (09 Oct 2017 15:26), Max: 37.2 (09 Oct 2017 15:26)  T(F): 99 (09 Oct 2017 15:26), Max: 99 (09 Oct 2017 15:26)  HR: 75 (09 Oct 2017 15:26) (71 - 75)  BP: 141/80 (09 Oct 2017 15:26) (137/78 - 141/80)  BP(mean): --  RR: 18 (09 Oct 2017 15:26) (15 - 18)  SpO2: 96% (09 Oct 2017 15:26) (96% - 98%)T(C): 37.2 (10-09-17 @ 15:26), Max: 37.2 (10-09-17 @ 15:26)  HR: 75 (10-09-17 @ 15:26) (71 - 75)  BP: 141/80 (10-09-17 @ 15:26) (137/78 - 141/80)  RR: 18 (10-09-17 @ 15:26) (15 - 18)  SpO2: 96% (10-09-17 @ 15:26) (96% - 98%)  Wt(kg): --    PHYSICAL EXAM:    GENERAL: NAD, well-groomed, well-developed  HEAD:  Atraumatic, Normocephalic  EYES: EOMI, PERRL, conjunctiva and sclera clear  ENMT:  Moist mucous membranes,  No lesions  NECK: Supple,   NERVOUS SYSTEM:  Alert & Oriented X3,  Moves upper and lower extremities;  slight left leg and arm weakness although much better compared to yesterday   CHEST/LUNG: Clear to auscultation bilaterally; No rales, rhonchi, wheezing,   HEART: Regular rate and rhythm; No murmurs,   ABDOMEN: Soft, Nontender, Nondistended; Bowel sounds present  EXTREMITIES:  Peripheral Pulses, No  cyanosis, or edema  SKIN: No rashes or lesions  psychiatry- mood and affect appropriate Insight and judgement intact     amLODIPine   Tablet 5 milliGRAM(s) Oral daily  aspirin 325 milliGRAM(s) Oral daily  atorvastatin 80 milliGRAM(s) Oral at bedtime  meclizine 25 milliGRAM(s) Oral two times a day PRN      LABS:                      CAPILLARY BLOOD GLUCOSE          RADIOLOGY & ADDITIONAL TESTS:      Consultant notes reviewed    Case discussed with consultant/provider/ family /patient
Patient is a 62y old  Male who presents with a chief complaint of left sided weakness and numbness (05 Oct 2017 01:54)      HPI:  Pt is a 61 yo male with pmh of htn and etoh abuse sp recent detox in 4/2017 who presents with left sided weakness. Pt states that at approx 12pm he had noticed weakness and numbness to his left leg which progressed to his left arm. Pt notes that if feels like his left sided of his body is asleep.  In the ED, pt was noted to be dragging his left foot.     States that he has not taken his bp medications for 4 months. (05 Oct 2017 01:54)    FAMILY HISTORY:  Family history of lung cancer (Father, Sibling)      INTERVAL HPI/OVERNIGHT EVENTS:  Pt. still c/o + Left leg weakness, + pins & needles in Left hand, + dizziness, + difficulty walking  denies chills/fever, ha, blurry vision, no sick contacts, no recent traveling    PAST MEDICAL & SURGICAL HISTORY:  HTN (hypertension)  H/O knee surgery  H/O left inguinal hernia repair      Allergies    No Known Allergies      Vital Signs Last 24 Hrs  T(C): 36.6 (05 Oct 2017 10:59), Max: 37.1 (05 Oct 2017 08:04)  T(F): 97.9 (05 Oct 2017 10:59), Max: 98.7 (05 Oct 2017 08:04)  HR: 68 (05 Oct 2017 10:59) (68 - 95)  BP: 142/77 (05 Oct 2017 10:59) (115/60 - 214/106)  BP(mean): --  RR: 16 (05 Oct 2017 10:59) (16 - 20)  SpO2: 95% (05 Oct 2017 10:59) (94% - 100%)        LAB:                        14.9   11.9  )-----------( 279      ( 04 Oct 2017 18:24 )             43.0     LIVER FUNCTIONS - ( 04 Oct 2017 18:24 )  Alb: 4.6 g/dL / Pro: 7.5 g/dL / ALK PHOS: 102 U/L / ALT: 16 U/L / AST: 16 U/L / GGT: x           PT/INR - ( 04 Oct 2017 18:24 )   PT: 10.9 sec;   INR: 0.99 ratio         PTT - ( 04 Oct 2017 18:24 )  PTT:32.4 sec      RADIOLOGY & ADDITIONAL STUDIES:  < from: MRI Head w/o Cont (10.05.17 @ 08:03) >  IMPRESSION: Acute small right posterior internal capsule limb infarct.      < from: CT Angio Neck w/ IV Cont (10.04.17 @ 18:53) >   Acute small right posterior internal capsule limb infarct. 50%   stenosis in the proximal right internal carotid artery.          MEDICATIONS:  aspirin 325 milliGRAM(s) Oral daily  atorvastatin 80 milliGRAM(s) Oral at bedtime
CC: left sided weakness and numbness (08 Oct 2017 13:07)    HPI: 62 y.o. male with PMHx of HTN, ETOH abuse sp recent detox in 4/2017, GERD presents with left sided weakness. Pt states that at approx 12pm he had noticed weakness and numbness to his left leg which progressed to his left arm. Pt notes that if feels like his left sided of his body is asleep.  In the ED, pt was noted to be dragging his left foot.   States that he has not taken his bp medications for 4 months. (05 Oct 2017 01:54)    INTERVAL HPI/OVERNIGHT EVENTS: none, c/o mild weakness in left hand and leg with paresthesias    Vital Signs Last 24 Hrs  T(C): 37 (10 Oct 2017 08:00), Max: 37.2 (09 Oct 2017 15:26)  T(F): 98.6 (10 Oct 2017 08:00), Max: 99 (09 Oct 2017 15:26)  HR: 70 (10 Oct 2017 08:00) (68 - 75)  BP: 122/80 (10 Oct 2017 08:00) (122/80 - 141/80)  RR: 18 (10 Oct 2017 08:00) (17 - 18)  SpO2: 97% (10 Oct 2017 08:00) (96% - 97%)    MEDICATIONS  (STANDING):  amLODIPine   Tablet 5 milliGRAM(s) Oral daily  aspirin 325 milliGRAM(s) Oral daily  atorvastatin 80 milliGRAM(s) Oral at bedtime    MEDICATIONS  (PRN):  meclizine 25 milliGRAM(s) Oral two times a day PRN Dizziness    RADIOLOGY & ADDITIONAL TESTS: personally visualized    PHYSICAL EXAM:    General: Well developed; well nourished; in no acute distress  Eyes: PERRLA, EOMI; conjunctiva and sclera clear  Head: Normocephalic; atraumatic  ENMT: No nasal discharge; airway clear  Neck: Supple; non tender; no masses  Respiratory: No wheezes, rales or rhonchi  Cardiovascular: Regular rate and rhythm. S1 and S2   Gastrointestinal: Soft non-tender non-distended; Normal bowel sounds  Genitourinary: No costovertebral angle tenderness  Extremities: Normal range of motion, No clubbing, cyanosis or edema, muscular strength 5/5  Vascular: Peripheral pulses palpable 2+ bilaterally  Neurological: Alert and oriented x4, CNII-XII grossly intact, DTR + 2 BL  Skin: Warm and dry.   Musculoskeletal: Normal gait, without deformities  Psychiatric: Cooperative and appropriate

## 2017-10-10 NOTE — PROGRESS NOTE ADULT - PROBLEM SELECTOR PROBLEM 1
CVA (cerebral vascular accident)
Cerebrovascular accident (CVA) due to stenosis of right carotid artery
CVA (cerebral vascular accident)

## 2017-10-10 NOTE — PROGRESS NOTE ADULT - ASSESSMENT
The patient is a 62y Male status post CVA.     Continue antiplatelet and statin.     Mobilize with physical therapy.     Discharge planning. ? Rehab.
The patient is a 62y Male status post CVA.     On antiplatelet and statin.     Will need rehab as unable to ambulate without assistance.     No further specific neurologic recommendations.
62 y.o. male with Hx of HTN, GERD, ETOH abuse sober house resident p/w left sided weakness - right IC CVA acute with DARLENE 50-69% stenosis.

## 2018-05-22 NOTE — ED PROVIDER NOTE - ALLERGIC/IMMUNOLOGIC NEGATIVE STATEMENT, MLM
chewing no dermatitis, no environmental allergies, no food allergies, no immunosuppressive disorder, and no pruritus.

## 2018-05-23 ENCOUNTER — APPOINTMENT (OUTPATIENT)
Dept: VASCULAR SURGERY | Facility: CLINIC | Age: 63
End: 2018-05-23
Payer: MEDICAID

## 2018-05-23 VITALS
HEART RATE: 91 BPM | BODY MASS INDEX: 27.4 KG/M2 | OXYGEN SATURATION: 100 % | HEIGHT: 69 IN | WEIGHT: 185 LBS | SYSTOLIC BLOOD PRESSURE: 168 MMHG | TEMPERATURE: 98.2 F | DIASTOLIC BLOOD PRESSURE: 84 MMHG

## 2018-05-23 DIAGNOSIS — Z83.3 FAMILY HISTORY OF DIABETES MELLITUS: ICD-10-CM

## 2018-05-23 DIAGNOSIS — F17.200 NICOTINE DEPENDENCE, UNSPECIFIED, UNCOMPLICATED: ICD-10-CM

## 2018-05-23 DIAGNOSIS — Z87.19 PERSONAL HISTORY OF OTHER DISEASES OF THE DIGESTIVE SYSTEM: ICD-10-CM

## 2018-05-23 DIAGNOSIS — Z86.73 PERSONAL HISTORY OF TRANSIENT ISCHEMIC ATTACK (TIA), AND CEREBRAL INFARCTION W/OUT RESIDUAL DEFICITS: ICD-10-CM

## 2018-05-23 DIAGNOSIS — Z82.49 FAMILY HISTORY OF ISCHEMIC HEART DISEASE AND OTHER DISEASES OF THE CIRCULATORY SYSTEM: ICD-10-CM

## 2018-05-23 DIAGNOSIS — Z86.79 PERSONAL HISTORY OF OTHER DISEASES OF THE CIRCULATORY SYSTEM: ICD-10-CM

## 2018-05-23 DIAGNOSIS — Z80.1 FAMILY HISTORY OF MALIGNANT NEOPLASM OF TRACHEA, BRONCHUS AND LUNG: ICD-10-CM

## 2018-05-23 DIAGNOSIS — Z83.79 FAMILY HISTORY OF OTHER DISEASES OF THE DIGESTIVE SYSTEM: ICD-10-CM

## 2018-05-23 DIAGNOSIS — Z82.61 FAMILY HISTORY OF ARTHRITIS: ICD-10-CM

## 2018-05-23 PROCEDURE — 99203 OFFICE O/P NEW LOW 30 MIN: CPT

## 2018-05-23 PROCEDURE — 93880 EXTRACRANIAL BILAT STUDY: CPT

## 2018-05-25 PROBLEM — Z87.19 HISTORY OF GASTROESOPHAGEAL REFLUX (GERD): Status: RESOLVED | Noted: 2018-05-23 | Resolved: 2018-05-25

## 2018-05-25 PROBLEM — Z80.1 FAMILY HISTORY OF LUNG CANCER: Status: ACTIVE | Noted: 2018-05-23

## 2018-05-25 PROBLEM — Z82.61 FAMILY HISTORY OF ARTHRITIS: Status: ACTIVE | Noted: 2018-05-23

## 2018-05-25 PROBLEM — Z83.3 FAMILY HISTORY OF DIABETES MELLITUS: Status: ACTIVE | Noted: 2018-05-23

## 2018-05-25 PROBLEM — F17.200 CURRENT EVERY DAY SMOKER: Status: ACTIVE | Noted: 2018-05-23

## 2018-05-25 PROBLEM — Z82.49 FAMILY HISTORY OF HYPERTENSION: Status: ACTIVE | Noted: 2018-05-23

## 2018-05-25 PROBLEM — Z86.73 HISTORY OF TRANSIENT CEREBRAL ISCHEMIA: Status: RESOLVED | Noted: 2018-05-23 | Resolved: 2018-05-25

## 2018-05-25 PROBLEM — Z83.79 FAMILY HISTORY OF GASTRIC ULCER: Status: ACTIVE | Noted: 2018-05-23

## 2018-05-25 PROBLEM — Z86.79 HISTORY OF HYPERTENSION: Status: RESOLVED | Noted: 2018-05-23 | Resolved: 2018-05-25

## 2018-05-31 ENCOUNTER — APPOINTMENT (OUTPATIENT)
Dept: GASTROENTEROLOGY | Facility: CLINIC | Age: 63
End: 2018-05-31

## 2018-06-01 ENCOUNTER — APPOINTMENT (OUTPATIENT)
Dept: NEUROLOGY | Facility: CLINIC | Age: 63
End: 2018-06-01
Payer: MEDICARE

## 2018-06-01 VITALS
HEIGHT: 69 IN | SYSTOLIC BLOOD PRESSURE: 138 MMHG | DIASTOLIC BLOOD PRESSURE: 84 MMHG | WEIGHT: 185 LBS | BODY MASS INDEX: 27.4 KG/M2

## 2018-06-01 PROCEDURE — 99214 OFFICE O/P EST MOD 30 MIN: CPT

## 2018-06-01 RX ORDER — ASPIRIN 325 MG/1
TABLET, FILM COATED ORAL
Refills: 0 | Status: COMPLETED | COMMUNITY
End: 2018-06-01

## 2018-06-20 ENCOUNTER — APPOINTMENT (OUTPATIENT)
Dept: PULMONOLOGY | Facility: CLINIC | Age: 63
End: 2018-06-20
Payer: MEDICAID

## 2018-06-20 VITALS
BODY MASS INDEX: 27.25 KG/M2 | WEIGHT: 184 LBS | HEIGHT: 69 IN | SYSTOLIC BLOOD PRESSURE: 160 MMHG | HEART RATE: 97 BPM | OXYGEN SATURATION: 91 % | DIASTOLIC BLOOD PRESSURE: 80 MMHG

## 2018-06-20 DIAGNOSIS — Z86.79 PERSONAL HISTORY OF OTHER DISEASES OF THE CIRCULATORY SYSTEM: ICD-10-CM

## 2018-06-20 DIAGNOSIS — Z86.73 PERSONAL HISTORY OF TRANSIENT ISCHEMIC ATTACK (TIA), AND CEREBRAL INFARCTION W/OUT RESIDUAL DEFICITS: ICD-10-CM

## 2018-06-20 PROCEDURE — 94727 GAS DIL/WSHOT DETER LNG VOL: CPT

## 2018-06-20 PROCEDURE — 85018 HEMOGLOBIN: CPT | Mod: QW

## 2018-06-20 PROCEDURE — 94729 DIFFUSING CAPACITY: CPT

## 2018-06-20 PROCEDURE — 99204 OFFICE O/P NEW MOD 45 MIN: CPT | Mod: 25

## 2018-06-20 PROCEDURE — 94664 DEMO&/EVAL PT USE INHALER: CPT | Mod: 59

## 2018-06-20 PROCEDURE — 94060 EVALUATION OF WHEEZING: CPT

## 2018-10-10 ENCOUNTER — APPOINTMENT (OUTPATIENT)
Dept: PULMONOLOGY | Facility: CLINIC | Age: 63
End: 2018-10-10
Payer: MEDICAID

## 2018-10-10 VITALS — OXYGEN SATURATION: 98 % | HEART RATE: 80 BPM | SYSTOLIC BLOOD PRESSURE: 140 MMHG | DIASTOLIC BLOOD PRESSURE: 82 MMHG

## 2018-10-10 VITALS — WEIGHT: 174 LBS | BODY MASS INDEX: 25.77 KG/M2 | HEIGHT: 69 IN

## 2018-10-10 DIAGNOSIS — R93.8 ABNORMAL FINDINGS ON DIAGNOSTIC IMAGING OF OTHER SPECIFIED BODY STRUCTURES: ICD-10-CM

## 2018-10-10 DIAGNOSIS — J44.9 CHRONIC OBSTRUCTIVE PULMONARY DISEASE, UNSPECIFIED: ICD-10-CM

## 2018-10-10 DIAGNOSIS — Z00.00 ENCOUNTER FOR GENERAL ADULT MEDICAL EXAMINATION W/OUT ABNORMAL FINDINGS: ICD-10-CM

## 2018-10-10 DIAGNOSIS — K21.9 GASTRO-ESOPHAGEAL REFLUX DISEASE W/OUT ESOPHAGITIS: ICD-10-CM

## 2018-10-10 PROBLEM — I10 ESSENTIAL (PRIMARY) HYPERTENSION: Chronic | Status: ACTIVE | Noted: 2017-10-05

## 2018-10-10 PROCEDURE — 99214 OFFICE O/P EST MOD 30 MIN: CPT

## 2018-11-14 ENCOUNTER — APPOINTMENT (OUTPATIENT)
Dept: VASCULAR SURGERY | Facility: CLINIC | Age: 63
End: 2018-11-14
Payer: MEDICAID

## 2018-11-14 VITALS
DIASTOLIC BLOOD PRESSURE: 91 MMHG | WEIGHT: 179 LBS | HEART RATE: 90 BPM | TEMPERATURE: 98 F | OXYGEN SATURATION: 98 % | SYSTOLIC BLOOD PRESSURE: 153 MMHG | BODY MASS INDEX: 26.51 KG/M2 | HEIGHT: 69 IN

## 2018-11-14 DIAGNOSIS — I65.29 OCCLUSION AND STENOSIS OF UNSPECIFIED CAROTID ARTERY: ICD-10-CM

## 2018-11-14 PROCEDURE — 99214 OFFICE O/P EST MOD 30 MIN: CPT

## 2018-11-14 PROCEDURE — 93880 EXTRACRANIAL BILAT STUDY: CPT

## 2018-12-07 ENCOUNTER — APPOINTMENT (OUTPATIENT)
Dept: NEUROLOGY | Facility: CLINIC | Age: 63
End: 2018-12-07

## 2019-04-08 ENCOUNTER — APPOINTMENT (OUTPATIENT)
Dept: PULMONOLOGY | Facility: CLINIC | Age: 64
End: 2019-04-08

## 2020-09-01 ENCOUNTER — OUTPATIENT (OUTPATIENT)
Dept: OUTPATIENT SERVICES | Facility: HOSPITAL | Age: 65
LOS: 1 days | End: 2020-09-01
Payer: MEDICAID

## 2020-09-01 DIAGNOSIS — Z98.890 OTHER SPECIFIED POSTPROCEDURAL STATES: Chronic | ICD-10-CM

## 2020-10-14 DIAGNOSIS — Z71.89 OTHER SPECIFIED COUNSELING: ICD-10-CM

## 2020-10-18 RX ORDER — ATORVASTATIN CALCIUM 80 MG/1
80 TABLET, FILM COATED ORAL
Qty: 30 | Refills: 0 | Status: ACTIVE | COMMUNITY

## 2020-10-19 ENCOUNTER — APPOINTMENT (OUTPATIENT)
Dept: CARDIOLOGY | Facility: CLINIC | Age: 65
End: 2020-10-19
Payer: MEDICAID

## 2020-10-19 ENCOUNTER — NON-APPOINTMENT (OUTPATIENT)
Age: 65
End: 2020-10-19

## 2020-10-19 VITALS
TEMPERATURE: 97.6 F | OXYGEN SATURATION: 100 % | WEIGHT: 170 LBS | HEIGHT: 69 IN | DIASTOLIC BLOOD PRESSURE: 71 MMHG | SYSTOLIC BLOOD PRESSURE: 164 MMHG | HEART RATE: 81 BPM | BODY MASS INDEX: 25.18 KG/M2

## 2020-10-19 VITALS — DIASTOLIC BLOOD PRESSURE: 78 MMHG | SYSTOLIC BLOOD PRESSURE: 152 MMHG

## 2020-10-19 DIAGNOSIS — R06.00 DYSPNEA, UNSPECIFIED: ICD-10-CM

## 2020-10-19 DIAGNOSIS — F17.200 NICOTINE DEPENDENCE, UNSPECIFIED, UNCOMPLICATED: ICD-10-CM

## 2020-10-19 DIAGNOSIS — I63.9 CEREBRAL INFARCTION, UNSPECIFIED: ICD-10-CM

## 2020-10-19 PROCEDURE — 99072 ADDL SUPL MATRL&STAF TM PHE: CPT

## 2020-10-19 PROCEDURE — 99204 OFFICE O/P NEW MOD 45 MIN: CPT

## 2020-10-19 PROCEDURE — 93000 ELECTROCARDIOGRAM COMPLETE: CPT

## 2020-10-19 RX ORDER — OMEPRAZOLE 20 MG/1
TABLET, DELAYED RELEASE ORAL
Refills: 0 | Status: DISCONTINUED | COMMUNITY
End: 2020-10-19

## 2020-10-19 RX ORDER — UMECLIDINIUM 62.5 UG/1
62.5 AEROSOL, POWDER ORAL DAILY
Qty: 3 | Refills: 1 | Status: DISCONTINUED | COMMUNITY
Start: 2018-06-20 | End: 2020-10-19

## 2020-10-19 RX ORDER — ASPIRIN 325 MG/1
325 TABLET ORAL
Refills: 0 | Status: DISCONTINUED | COMMUNITY
Start: 2018-04-19 | End: 2020-10-19

## 2020-10-19 NOTE — PHYSICAL EXAM
[General Appearance - Well Developed] : well developed [General Appearance - Well Nourished] : well nourished [Normal Conjunctiva] : the conjunctiva exhibited no abnormalities [Heart Sounds] : normal S1 and S2 [Murmurs] : no murmurs present [Respiration, Rhythm And Depth] : normal respiratory rhythm and effort [Auscultation Breath Sounds / Voice Sounds] : lungs were clear to auscultation bilaterally [Bowel Sounds] : normal bowel sounds [Abdomen Soft] : soft [FreeTextEntry1] : Difficulty walking due to Stroke. Walks with a limp [] : no rash

## 2020-10-19 NOTE — HISTORY OF PRESENT ILLNESS
[FreeTextEntry1] : \par Patient is a 66 yo M with history of HTN, GERD, COPD and right CVA , lacunar stroke in Oct 2017 , chronic smoker still smoking less than pack daily.  Patient has a residual left leg weakness which causes some difficulty in walking.  Patient complains of being short of breath with exertion denies any chest pain or palpitations.  Patient denies orthopnea, PND or leg edema.  Patient states that recently his primary care physician added lisinopril to control his blood pressure.  Patient denies orthopnea, PND leg edema\par Patient has proximal right ICA 50 to 69% stenosis which is followed by the vascular surgeon.\par Patient has no prior CAD or CHF.  Patient has no history of diabetes mellitus.  Patient reports that he has stopped drinking alcohol 3 years ago.

## 2020-10-19 NOTE — ASSESSMENT
[FreeTextEntry1] : Summary:\par  1. Left ventricular ejection fraction, by visual estimation, is 50 to \par 55%.\par  2. Low normal global left ventricular systolic function.\par  3. Spectral Doppler shows impaired relaxation pattern of left \par ventricular myocardial filling (Grade I diastolic dysfunction).\par  4. Normal left ventricular internal cavity size.\par  5. There is mild concentric left ventricular hypertrophy.\par  6. Mildly enlarged left atrium.\par  7. Mildly dilated right atrium.\par  8. Mild mitral annular calcification.\par  9. Mild mitral valve regurgitation.\par 10. Normal trileaflet aortic valve with normal opening.\par 11. Dilatation of the aortic root.\par 12. There is no evidence of pericardial effusion.\par \par  MD Lalit Electronically signed on 10/6/2017 at 9:40:12 AM\par \par EKG: October 19, 2020: Normal sinus rhythm, PACs nonspecific ST depression.\par \par Assessment:\par 1.  Exertional dyspnea\par 2.  Hypertension still elevated-on amlodipine and lisinopril\par 3.  CVA  in 2017,with residual left leg weakness\par 4.  Chronic smoker still smoking\par \par Recommendations:\par 1.  Echocardiogram\par 2.  Pharmacological nuclear stress test-patient is unable to walk on the treadmill\par 3.  Patient was counseled against smoking\par 4.  Patient advised to follow-up with his primary care physician for aggressive management of his hypertension.\par 5.  Follow-up in the office in 3 months\par

## 2020-10-21 ENCOUNTER — APPOINTMENT (OUTPATIENT)
Dept: PULMONOLOGY | Facility: CLINIC | Age: 65
End: 2020-10-21

## 2020-11-05 ENCOUNTER — APPOINTMENT (OUTPATIENT)
Dept: NEUROLOGY | Facility: CLINIC | Age: 65
End: 2020-11-05

## 2020-11-23 ENCOUNTER — APPOINTMENT (OUTPATIENT)
Dept: CARDIOLOGY | Facility: CLINIC | Age: 65
End: 2020-11-23

## 2020-12-09 ENCOUNTER — APPOINTMENT (OUTPATIENT)
Dept: GASTROENTEROLOGY | Facility: CLINIC | Age: 65
End: 2020-12-09

## 2021-01-01 ENCOUNTER — OUTPATIENT (OUTPATIENT)
Dept: OUTPATIENT SERVICES | Facility: HOSPITAL | Age: 66
LOS: 1 days | End: 2021-01-01
Payer: MEDICAID

## 2021-01-01 DIAGNOSIS — Z98.890 OTHER SPECIFIED POSTPROCEDURAL STATES: Chronic | ICD-10-CM

## 2021-01-01 PROCEDURE — G9005: CPT

## 2021-01-01 PROCEDURE — H0002: CPT

## 2021-01-22 ENCOUNTER — APPOINTMENT (OUTPATIENT)
Dept: CARDIOLOGY | Facility: CLINIC | Age: 66
End: 2021-01-22

## 2021-02-01 ENCOUNTER — OUTPATIENT (OUTPATIENT)
Dept: OUTPATIENT SERVICES | Facility: HOSPITAL | Age: 66
LOS: 1 days | End: 2021-02-01
Payer: MEDICARE

## 2021-02-01 DIAGNOSIS — Z98.890 OTHER SPECIFIED POSTPROCEDURAL STATES: Chronic | ICD-10-CM

## 2021-03-11 DIAGNOSIS — Z71.89 OTHER SPECIFIED COUNSELING: ICD-10-CM

## 2021-03-12 DIAGNOSIS — Z71.89 OTHER SPECIFIED COUNSELING: ICD-10-CM

## 2021-09-01 PROCEDURE — G9005: CPT

## 2021-12-14 NOTE — PHYSICAL THERAPY INITIAL EVALUATION ADULT - ASR WT BEARING STATUS EVAL
Verbal order from Dr. Perez for UA/UC for patient due to urinary symptoms. Order placed.     Svetlana Martinez RN  [facility]   
no weight-bearing restrictions

## 2023-08-21 ENCOUNTER — INPATIENT (INPATIENT)
Facility: HOSPITAL | Age: 68
LOS: 3 days | Discharge: HOME | DRG: 682 | End: 2023-08-25
Attending: FAMILY MEDICINE | Admitting: HOSPITALIST
Payer: MEDICARE

## 2023-08-21 VITALS
DIASTOLIC BLOOD PRESSURE: 99 MMHG | OXYGEN SATURATION: 99 % | WEIGHT: 164.91 LBS | HEART RATE: 110 BPM | SYSTOLIC BLOOD PRESSURE: 162 MMHG | TEMPERATURE: 98 F | RESPIRATION RATE: 17 BRPM | HEIGHT: 69 IN

## 2023-08-21 DIAGNOSIS — Z98.890 OTHER SPECIFIED POSTPROCEDURAL STATES: Chronic | ICD-10-CM

## 2023-08-21 DIAGNOSIS — N17.9 ACUTE KIDNEY FAILURE, UNSPECIFIED: ICD-10-CM

## 2023-08-21 LAB
ALBUMIN SERPL ELPH-MCNC: 3.6 G/DL — SIGNIFICANT CHANGE UP (ref 3.3–5.2)
ALP SERPL-CCNC: 86 U/L — SIGNIFICANT CHANGE UP (ref 40–120)
ALT FLD-CCNC: 13 U/L — SIGNIFICANT CHANGE UP
ANION GAP SERPL CALC-SCNC: 21 MMOL/L — HIGH (ref 5–17)
AST SERPL-CCNC: 24 U/L — SIGNIFICANT CHANGE UP
BASE EXCESS BLDA CALC-SCNC: -8.5 MMOL/L — LOW (ref -2–3)
BASOPHILS # BLD AUTO: 0.04 K/UL — SIGNIFICANT CHANGE UP (ref 0–0.2)
BASOPHILS NFR BLD AUTO: 0.4 % — SIGNIFICANT CHANGE UP (ref 0–2)
BILIRUB SERPL-MCNC: 0.4 MG/DL — SIGNIFICANT CHANGE UP (ref 0.4–2)
BLOOD GAS COMMENTS ARTERIAL: SIGNIFICANT CHANGE UP
BUN SERPL-MCNC: 62.4 MG/DL — HIGH (ref 8–20)
CALCIUM SERPL-MCNC: 9.1 MG/DL — SIGNIFICANT CHANGE UP (ref 8.4–10.5)
CHLORIDE SERPL-SCNC: 100 MMOL/L — SIGNIFICANT CHANGE UP (ref 96–108)
CO2 SERPL-SCNC: 15 MMOL/L — LOW (ref 22–29)
CREAT SERPL-MCNC: 11.28 MG/DL — HIGH (ref 0.5–1.3)
EGFR: 4 ML/MIN/1.73M2 — LOW
EOSINOPHIL # BLD AUTO: 0.03 K/UL — SIGNIFICANT CHANGE UP (ref 0–0.5)
EOSINOPHIL NFR BLD AUTO: 0.3 % — SIGNIFICANT CHANGE UP (ref 0–6)
GLUCOSE SERPL-MCNC: 94 MG/DL — SIGNIFICANT CHANGE UP (ref 70–99)
HCO3 BLDA-SCNC: 17 MMOL/L — LOW (ref 21–28)
HCT VFR BLD CALC: 33.5 % — LOW (ref 39–50)
HGB BLD-MCNC: 11.3 G/DL — LOW (ref 13–17)
HOROWITZ INDEX BLDA+IHG-RTO: 0.21 — SIGNIFICANT CHANGE UP
IMM GRANULOCYTES NFR BLD AUTO: 0.8 % — SIGNIFICANT CHANGE UP (ref 0–0.9)
LYMPHOCYTES # BLD AUTO: 1.18 K/UL — SIGNIFICANT CHANGE UP (ref 1–3.3)
LYMPHOCYTES # BLD AUTO: 11.9 % — LOW (ref 13–44)
MCHC RBC-ENTMCNC: 31.4 PG — SIGNIFICANT CHANGE UP (ref 27–34)
MCHC RBC-ENTMCNC: 33.7 GM/DL — SIGNIFICANT CHANGE UP (ref 32–36)
MCV RBC AUTO: 93.1 FL — SIGNIFICANT CHANGE UP (ref 80–100)
MONOCYTES # BLD AUTO: 0.9 K/UL — SIGNIFICANT CHANGE UP (ref 0–0.9)
MONOCYTES NFR BLD AUTO: 9.1 % — SIGNIFICANT CHANGE UP (ref 2–14)
NEUTROPHILS # BLD AUTO: 7.69 K/UL — HIGH (ref 1.8–7.4)
NEUTROPHILS NFR BLD AUTO: 77.5 % — HIGH (ref 43–77)
NT-PROBNP SERPL-SCNC: HIGH PG/ML (ref 0–300)
PCO2 BLDA: 29 MMHG — LOW (ref 35–48)
PH BLDA: 7.37 — SIGNIFICANT CHANGE UP (ref 7.35–7.45)
PLATELET # BLD AUTO: 350 K/UL — SIGNIFICANT CHANGE UP (ref 150–400)
PO2 BLDA: 95 MMHG — SIGNIFICANT CHANGE UP (ref 83–108)
POTASSIUM SERPL-MCNC: 6.2 MMOL/L — CRITICAL HIGH (ref 3.5–5.3)
POTASSIUM SERPL-MCNC: 7 MMOL/L — CRITICAL HIGH (ref 3.5–5.3)
POTASSIUM SERPL-SCNC: 6.2 MMOL/L — CRITICAL HIGH (ref 3.5–5.3)
POTASSIUM SERPL-SCNC: 7 MMOL/L — CRITICAL HIGH (ref 3.5–5.3)
PROT SERPL-MCNC: 6.9 G/DL — SIGNIFICANT CHANGE UP (ref 6.6–8.7)
RBC # BLD: 3.6 M/UL — LOW (ref 4.2–5.8)
RBC # FLD: 13.2 % — SIGNIFICANT CHANGE UP (ref 10.3–14.5)
SAO2 % BLDA: 99.7 % — HIGH (ref 94–98)
SODIUM SERPL-SCNC: 136 MMOL/L — SIGNIFICANT CHANGE UP (ref 135–145)
TROPONIN T SERPL-MCNC: 0.03 NG/ML — SIGNIFICANT CHANGE UP (ref 0–0.06)
WBC # BLD: 9.92 K/UL — SIGNIFICANT CHANGE UP (ref 3.8–10.5)
WBC # FLD AUTO: 9.92 K/UL — SIGNIFICANT CHANGE UP (ref 3.8–10.5)

## 2023-08-21 PROCEDURE — 99223 1ST HOSP IP/OBS HIGH 75: CPT

## 2023-08-21 PROCEDURE — 93010 ELECTROCARDIOGRAM REPORT: CPT

## 2023-08-21 PROCEDURE — 99285 EMERGENCY DEPT VISIT HI MDM: CPT

## 2023-08-21 PROCEDURE — 71045 X-RAY EXAM CHEST 1 VIEW: CPT | Mod: 26

## 2023-08-21 RX ORDER — ALBUTEROL 90 UG/1
2.5 AEROSOL, METERED ORAL
Refills: 0 | Status: COMPLETED | OUTPATIENT
Start: 2023-08-21 | End: 2023-08-21

## 2023-08-21 RX ORDER — INSULIN HUMAN 100 [IU]/ML
10 INJECTION, SOLUTION SUBCUTANEOUS ONCE
Refills: 0 | Status: COMPLETED | OUTPATIENT
Start: 2023-08-21 | End: 2023-08-21

## 2023-08-21 RX ORDER — ONDANSETRON 8 MG/1
4 TABLET, FILM COATED ORAL EVERY 8 HOURS
Refills: 0 | Status: DISCONTINUED | OUTPATIENT
Start: 2023-08-21 | End: 2023-08-25

## 2023-08-21 RX ORDER — ACETAMINOPHEN 500 MG
650 TABLET ORAL EVERY 6 HOURS
Refills: 0 | Status: DISCONTINUED | OUTPATIENT
Start: 2023-08-21 | End: 2023-08-24

## 2023-08-21 RX ORDER — SODIUM CHLORIDE 9 MG/ML
1000 INJECTION, SOLUTION INTRAVENOUS
Refills: 0 | Status: DISCONTINUED | OUTPATIENT
Start: 2023-08-21 | End: 2023-08-23

## 2023-08-21 RX ORDER — HEPARIN SODIUM 5000 [USP'U]/ML
5000 INJECTION INTRAVENOUS; SUBCUTANEOUS EVERY 8 HOURS
Refills: 0 | Status: DISCONTINUED | OUTPATIENT
Start: 2023-08-21 | End: 2023-08-24

## 2023-08-21 RX ORDER — NICOTINE POLACRILEX 2 MG
1 GUM BUCCAL DAILY
Refills: 0 | Status: DISCONTINUED | OUTPATIENT
Start: 2023-08-21 | End: 2023-08-25

## 2023-08-21 RX ORDER — LANOLIN ALCOHOL/MO/W.PET/CERES
3 CREAM (GRAM) TOPICAL AT BEDTIME
Refills: 0 | Status: DISCONTINUED | OUTPATIENT
Start: 2023-08-21 | End: 2023-08-25

## 2023-08-21 RX ORDER — DEXTROSE 50 % IN WATER 50 %
50 SYRINGE (ML) INTRAVENOUS ONCE
Refills: 0 | Status: COMPLETED | OUTPATIENT
Start: 2023-08-21 | End: 2023-08-21

## 2023-08-21 RX ORDER — CALCIUM GLUCONATE 100 MG/ML
1 VIAL (ML) INTRAVENOUS ONCE
Refills: 0 | Status: COMPLETED | OUTPATIENT
Start: 2023-08-21 | End: 2023-08-21

## 2023-08-21 RX ORDER — SODIUM ZIRCONIUM CYCLOSILICATE 10 G/10G
10 POWDER, FOR SUSPENSION ORAL ONCE
Refills: 0 | Status: COMPLETED | OUTPATIENT
Start: 2023-08-21 | End: 2023-08-21

## 2023-08-21 RX ADMIN — ALBUTEROL 2.5 MILLIGRAM(S): 90 AEROSOL, METERED ORAL at 22:41

## 2023-08-21 RX ADMIN — INSULIN HUMAN 10 UNIT(S): 100 INJECTION, SOLUTION SUBCUTANEOUS at 21:53

## 2023-08-21 RX ADMIN — Medication 50 MILLILITER(S): at 21:53

## 2023-08-21 RX ADMIN — SODIUM CHLORIDE 100 MILLILITER(S): 9 INJECTION, SOLUTION INTRAVENOUS at 22:32

## 2023-08-21 RX ADMIN — ALBUTEROL 2.5 MILLIGRAM(S): 90 AEROSOL, METERED ORAL at 21:49

## 2023-08-21 RX ADMIN — ALBUTEROL 2.5 MILLIGRAM(S): 90 AEROSOL, METERED ORAL at 22:33

## 2023-08-21 RX ADMIN — Medication 100 GRAM(S): at 21:50

## 2023-08-21 RX ADMIN — SODIUM ZIRCONIUM CYCLOSILICATE 10 GRAM(S): 10 POWDER, FOR SUSPENSION ORAL at 21:53

## 2023-08-21 NOTE — ED ADULT NURSE NOTE - OBJECTIVE STATEMENT
Assumed care of pt at 1720. Pt A&Ox4 c/o difficulty urinating. PT denies CP and SOB. RR Even and unlabored. Assumed care of pt at 1720. Pt A&Ox4 c/o difficulty urinating and pain. Pt endorses to loose watery stools. PT arrives to ED with bilateral leg swelling. PT denies CP and SOB. RR Even and unlabored.

## 2023-08-21 NOTE — ED PROVIDER NOTE - CLINICAL SUMMARY MEDICAL DECISION MAKING FREE TEXT BOX
pt admitted for acute renal failure, oliguria. I discussed with nephrology who advised bicarb drip, will see in am.,

## 2023-08-21 NOTE — ED PROVIDER NOTE - PROGRESS NOTE DETAILS
Daniela FERNANDO for ED attending, Dr. Domínguez. 69 y/o male with pmhx of TIA, and HTN,, c/o difficulty urinating, b/l leg swelling and abdominal distention. Pt is a smoker. Former drinker. Pt called PMD and was told closest appointment in Sep.  On exam has Tachypneic, tachycardiac and 3+ pitting edema. Pt is not compliment with medication. Pt will be placed in the main ED for complete evaluation by another provider.

## 2023-08-21 NOTE — H&P ADULT - NSICDXFAMILYHX_GEN_ALL_CORE_FT
FAMILY HISTORY:  Father  Still living? Unknown  Family history of lung cancer, Age at diagnosis: Age Unknown    Sibling  Still living? Unknown  Family history of lung cancer, Age at diagnosis: Age Unknown

## 2023-08-21 NOTE — H&P ADULT - NSHPPHYSICALEXAM_GEN_ALL_CORE
Vital Signs Last 24 Hrs  T(C): 36.9 (21 Aug 2023 14:16), Max: 36.9 (21 Aug 2023 14:16)  T(F): 98.4 (21 Aug 2023 14:16), Max: 98.4 (21 Aug 2023 14:16)  HR: 110 (21 Aug 2023 14:16) (110 - 110)  BP: 162/99 (21 Aug 2023 14:16) (162/99 - 162/99)  BP(mean): --  RR: 17 (21 Aug 2023 14:16) (17 - 17)  SpO2: 99% (21 Aug 2023 14:16) (99% - 99%)    Parameters below as of 21 Aug 2023 14:16  Patient On (Oxygen Delivery Method): room air

## 2023-08-21 NOTE — H&P ADULT - ASSESSMENT
69 y/o male with PMH of  TIA,  HTN came to the ED complaining of LE edema x 1 week.             Hyperkalemia with JUANJO   Admit to telemetry   K: 7.0 (hemolyzed); repeat 6.2  Cr. 11.28  No ECG changes   Hyperkalemia cocktail given   Will repeat BMP   Nephrology consulted by ED, sodium bicarb started as rec, will see in AM     B/l LE edema with dyspnea on exertion   BNP: 68009  Reported orthopnea   Will check echo to assess cardiac structural/functional status   Oxygen therapy as needed   Will get cardiology consult as needed base on echo result     HTN  Continue     Supportive   DVT prophylaxis: Heparin sc   Diet: cardiac     Plan of care discussed with patient          69 y/o male with PMH of  TIA,  HTN came to the ED complaining of LE edema x 1 week with associated lower abdominal pain around his waist, decrease urination, dyspnea on exertion, orthopnea, chills and decrease appetite. In the ED, found to be in JUANJO, with hyperkalemia.     JUANJO with Hyperkalemia   Admit to telemetry   K: 7.0 (hemolyzed); repeat 6.2  Cr. 11.28  A, ABG noted   No ECG changes   Hyperkalemia cocktail given   Will repeat BMP   Bateman placed  Nephrology consulted by ED, sodium bicarb started as rec, will see in AM   Will get CT w/o con to rule out any structural abnormality.     B/l LE edema with dyspnea on exertion   BNP: 50624  Reported orthopnea   Will check echo to assess cardiac structural/functional status   Oxygen therapy as needed   Will get cardiology consult as needed base on echo result     HTN/TIA   Patient has not taken medications in 8 months   Amlodipine 10mg   Will hold Lisinopril   Atorvastatin 80mg     Nicotine dependence   Nicotine patch   Cessation advised     Supportive   DVT prophylaxis: Heparin sc   Diet: cardiac     Plan of care discussed with patient          67 y/o male with PMH of  TIA,  HTN came to the ED complaining of LE edema x 1 week with associated lower abdominal pain around his waist, decrease urination, dyspnea on exertion, orthopnea, chills and decrease appetite. In the ED, found to be in JUANJO, with hyperkalemia.     JUANJO with Hyperkalemia   Admit to telemetry   K: 7.0 (hemolyzed); repeat 6.2  Cr. 11.28  A, ABG noted   No ECG changes   Hyperkalemia cocktail given   Will repeat BMP   Boo placed  Nephrology consulted by ED, sodium bicarb started as rec, will see in AM   Will get CT w/o con to rule out any structural abnormality.     B/l LE edema with dyspnea on exertion   BNP: 65798  Reported orthopnea   Will check echo to assess cardiac structural/functional status   Oxygen therapy as needed   Will get cardiology consult as needed base on echo result     HTN/TIA   Patient has not taken medications in 8 months   Amlodipine 10mg   Will hold Lisinopril   Atorvastatin 80mg     Nicotine dependence   Nicotine patch   Cessation advised     Supportive   DVT prophylaxis: Heparin sc   Diet: cardiac     Plan of care discussed with patient and nurse       Addendum:   CT abdomen done, preliminary report showed boo not properly placed. ER attending changed the boo, >2L of urine drained. Moderate b/l hydroureteronephrosis noted. Follow up official report.

## 2023-08-21 NOTE — ED ADULT NURSE REASSESSMENT NOTE - NS ED NURSE REASSESS COMMENT FT1
Received Pt from Eliseo NUNEZ pt resting quietly in stretcher NAD noted at this time. CM remains in place NSR noted at this time. pt safety continued.

## 2023-08-21 NOTE — ED PROVIDER NOTE - HIV OFFER
Per Pharmacy there are no changes in medication, dosage, sig., or quantity.  The medication are set up as pending and waiting for your approval. The preferred pharmacy has been set up and verified.     Opt out

## 2023-08-21 NOTE — ED STATDOCS - PROGRESS NOTE DETAILS
Daniela FERNANDO for ED attending, Dr. Domínguez. Progress: Daniela FERNANDO for ED attending, Dr. Domínguez. 69 y/o male with pmhx of TIA, and HTN,, c/o difficulty urinating, b/l leg swelling and abdominal distention. Pt is a smoker. Former drinker. Pt called PMD and was told closest appointment in Sep.  On exam has Tachypneic, tachycardiac and 3+ pitting edema. Pt is not compliment with medication. Pt will be placed in the main ED for complete evaluation by another provider. Daniela FERNANDO for ED attending, Dr. Domínguez. Progress: Daniela FERNANDO for ED attending, Dr. Domínguez. 69 y/o male with pmhx of TIA, and HTN,, c/o difficulty urinating, b/l leg swelling and abdominal distention. Pt is a smoker. Former drinker. Pt called PMD and was told closest appointment in Sep.  On exam has Tachypneic, tachycardiac and 3+ pitting edema. Pt is not compliant with medication. Pt will be placed in the main ED   ? CM CHF Cirrhosis for complete evaluation by another provider.

## 2023-08-21 NOTE — ED PROVIDER NOTE - OBJECTIVE STATEMENT
68 year old male with PMh TIA and HTN presents with edema. Pt states that he has noticed 1 week of worsening b/l LE edema. Then, he reports 2 days of SCHMITZ< orthopnea and difficulty urinating with increasing pelvic pressure. No chest pain, fevers, chills.

## 2023-08-21 NOTE — ED ADULT NURSE NOTE - NSFALLUNIVINTERV_ED_ALL_ED
Bed/Stretcher in lowest position, wheels locked, appropriate side rails in place/Call bell, personal items and telephone in reach/Instruct patient to call for assistance before getting out of bed/chair/stretcher/Non-slip footwear applied when patient is off stretcher/[place] to call system/Physically safe environment - no spills, clutter or unnecessary equipment/Purposeful proactive rounding/Room/bathroom lighting operational, light cord in reach

## 2023-08-21 NOTE — H&P ADULT - HISTORY OF PRESENT ILLNESS
69 y/o male with PMH of  TIA,  HTN came to the ED complaining of LE edema x 1 week. Patient first noticed swelling of the LLE but later both extremities extending to his abdomen. Reported lower abdominal pain around his waist, decrease urination, dyspnea on exertion, orthopnea, chills and decrease appetite in the last 4 days. Of note, patient said he has not taken his medications in about 8 months because he ran out and could not secure an appointment with his PCP. He has no chest pain, nausea, vomiting, palpitation, fever, recent travel, calf pain, HA.

## 2023-08-21 NOTE — ED ADULT TRIAGE NOTE - CHIEF COMPLAINT QUOTE
Bilateral ankle and leg swelling x 1 week. Endorses difficulty urinating since yesterday, states he is having lower abd. pain. Denies hematuria. States he has HTN, but does not take medication. Denies chest pain, states he is having a hard time catching his breath.

## 2023-08-21 NOTE — H&P ADULT - TIME BILLING
Chart reviewed, lab and imaging reviewed. Medication reconciliation, physical exam and documentation. Discussion with patient and ER attending.

## 2023-08-22 LAB
ANION GAP SERPL CALC-SCNC: 15 MMOL/L — SIGNIFICANT CHANGE UP (ref 5–17)
ANION GAP SERPL CALC-SCNC: 20 MMOL/L — HIGH (ref 5–17)
ANION GAP SERPL CALC-SCNC: 21 MMOL/L — HIGH (ref 5–17)
BUN SERPL-MCNC: 43.3 MG/DL — HIGH (ref 8–20)
BUN SERPL-MCNC: 63.4 MG/DL — HIGH (ref 8–20)
BUN SERPL-MCNC: 65.6 MG/DL — HIGH (ref 8–20)
CALCIUM SERPL-MCNC: 8.7 MG/DL — SIGNIFICANT CHANGE UP (ref 8.4–10.5)
CALCIUM SERPL-MCNC: 8.7 MG/DL — SIGNIFICANT CHANGE UP (ref 8.4–10.5)
CALCIUM SERPL-MCNC: 9 MG/DL — SIGNIFICANT CHANGE UP (ref 8.4–10.5)
CHLORIDE SERPL-SCNC: 100 MMOL/L — SIGNIFICANT CHANGE UP (ref 96–108)
CHLORIDE SERPL-SCNC: 103 MMOL/L — SIGNIFICANT CHANGE UP (ref 96–108)
CHLORIDE SERPL-SCNC: 107 MMOL/L — SIGNIFICANT CHANGE UP (ref 96–108)
CO2 SERPL-SCNC: 15 MMOL/L — LOW (ref 22–29)
CO2 SERPL-SCNC: 16 MMOL/L — LOW (ref 22–29)
CO2 SERPL-SCNC: 20 MMOL/L — LOW (ref 22–29)
CREAT SERPL-MCNC: 11.87 MG/DL — HIGH (ref 0.5–1.3)
CREAT SERPL-MCNC: 12.18 MG/DL — HIGH (ref 0.5–1.3)
CREAT SERPL-MCNC: 5.34 MG/DL — HIGH (ref 0.5–1.3)
EGFR: 11 ML/MIN/1.73M2 — LOW
EGFR: 4 ML/MIN/1.73M2 — LOW
EGFR: 4 ML/MIN/1.73M2 — LOW
GLUCOSE BLDC GLUCOMTR-MCNC: 100 MG/DL — HIGH (ref 70–99)
GLUCOSE BLDC GLUCOMTR-MCNC: 104 MG/DL — HIGH (ref 70–99)
GLUCOSE BLDC GLUCOMTR-MCNC: 105 MG/DL — HIGH (ref 70–99)
GLUCOSE BLDC GLUCOMTR-MCNC: 118 MG/DL — HIGH (ref 70–99)
GLUCOSE BLDC GLUCOMTR-MCNC: 124 MG/DL — HIGH (ref 70–99)
GLUCOSE BLDC GLUCOMTR-MCNC: 165 MG/DL — HIGH (ref 70–99)
GLUCOSE BLDC GLUCOMTR-MCNC: 299 MG/DL — HIGH (ref 70–99)
GLUCOSE SERPL-MCNC: 112 MG/DL — HIGH (ref 70–99)
GLUCOSE SERPL-MCNC: 86 MG/DL — SIGNIFICANT CHANGE UP (ref 70–99)
GLUCOSE SERPL-MCNC: 89 MG/DL — SIGNIFICANT CHANGE UP (ref 70–99)
HCT VFR BLD CALC: 31 % — LOW (ref 39–50)
HCV AB S/CO SERPL IA: 0.06 S/CO — SIGNIFICANT CHANGE UP (ref 0–0.99)
HCV AB SERPL-IMP: SIGNIFICANT CHANGE UP
HGB BLD-MCNC: 10.3 G/DL — LOW (ref 13–17)
MCHC RBC-ENTMCNC: 30.9 PG — SIGNIFICANT CHANGE UP (ref 27–34)
MCHC RBC-ENTMCNC: 33.2 GM/DL — SIGNIFICANT CHANGE UP (ref 32–36)
MCV RBC AUTO: 93.1 FL — SIGNIFICANT CHANGE UP (ref 80–100)
PLATELET # BLD AUTO: 307 K/UL — SIGNIFICANT CHANGE UP (ref 150–400)
POTASSIUM SERPL-MCNC: 4.8 MMOL/L — SIGNIFICANT CHANGE UP (ref 3.5–5.3)
POTASSIUM SERPL-MCNC: 5.3 MMOL/L — SIGNIFICANT CHANGE UP (ref 3.5–5.3)
POTASSIUM SERPL-MCNC: 5.7 MMOL/L — HIGH (ref 3.5–5.3)
POTASSIUM SERPL-SCNC: 4.8 MMOL/L — SIGNIFICANT CHANGE UP (ref 3.5–5.3)
POTASSIUM SERPL-SCNC: 5.3 MMOL/L — SIGNIFICANT CHANGE UP (ref 3.5–5.3)
POTASSIUM SERPL-SCNC: 5.7 MMOL/L — HIGH (ref 3.5–5.3)
RBC # BLD: 3.33 M/UL — LOW (ref 4.2–5.8)
RBC # FLD: 13.2 % — SIGNIFICANT CHANGE UP (ref 10.3–14.5)
SODIUM SERPL-SCNC: 136 MMOL/L — SIGNIFICANT CHANGE UP (ref 135–145)
SODIUM SERPL-SCNC: 138 MMOL/L — SIGNIFICANT CHANGE UP (ref 135–145)
SODIUM SERPL-SCNC: 142 MMOL/L — SIGNIFICANT CHANGE UP (ref 135–145)
WBC # BLD: 7.35 K/UL — SIGNIFICANT CHANGE UP (ref 3.8–10.5)
WBC # FLD AUTO: 7.35 K/UL — SIGNIFICANT CHANGE UP (ref 3.8–10.5)

## 2023-08-22 PROCEDURE — 12345: CPT | Mod: NC

## 2023-08-22 PROCEDURE — 76770 US EXAM ABDO BACK WALL COMP: CPT | Mod: 26

## 2023-08-22 PROCEDURE — 74176 CT ABD & PELVIS W/O CONTRAST: CPT | Mod: 26

## 2023-08-22 PROCEDURE — 93306 TTE W/DOPPLER COMPLETE: CPT | Mod: 26

## 2023-08-22 PROCEDURE — 99223 1ST HOSP IP/OBS HIGH 75: CPT

## 2023-08-22 RX ORDER — TAMSULOSIN HYDROCHLORIDE 0.4 MG/1
0.4 CAPSULE ORAL AT BEDTIME
Refills: 0 | Status: DISCONTINUED | OUTPATIENT
Start: 2023-08-22 | End: 2023-08-25

## 2023-08-22 RX ORDER — DEXTROSE 50 % IN WATER 50 %
50 SYRINGE (ML) INTRAVENOUS ONCE
Refills: 0 | Status: COMPLETED | OUTPATIENT
Start: 2023-08-22 | End: 2023-08-22

## 2023-08-22 RX ORDER — LABETALOL HCL 100 MG
10 TABLET ORAL ONCE
Refills: 0 | Status: COMPLETED | OUTPATIENT
Start: 2023-08-22 | End: 2023-08-22

## 2023-08-22 RX ORDER — ASPIRIN/CALCIUM CARB/MAGNESIUM 324 MG
81 TABLET ORAL DAILY
Refills: 0 | Status: DISCONTINUED | OUTPATIENT
Start: 2023-08-22 | End: 2023-08-25

## 2023-08-22 RX ORDER — HYDROMORPHONE HYDROCHLORIDE 2 MG/ML
0.5 INJECTION INTRAMUSCULAR; INTRAVENOUS; SUBCUTANEOUS ONCE
Refills: 0 | Status: DISCONTINUED | OUTPATIENT
Start: 2023-08-22 | End: 2023-08-22

## 2023-08-22 RX ORDER — INSULIN HUMAN 100 [IU]/ML
5 INJECTION, SOLUTION SUBCUTANEOUS ONCE
Refills: 0 | Status: COMPLETED | OUTPATIENT
Start: 2023-08-22 | End: 2023-08-22

## 2023-08-22 RX ORDER — ATORVASTATIN CALCIUM 80 MG/1
80 TABLET, FILM COATED ORAL AT BEDTIME
Refills: 0 | Status: DISCONTINUED | OUTPATIENT
Start: 2023-08-22 | End: 2023-08-25

## 2023-08-22 RX ORDER — SODIUM ZIRCONIUM CYCLOSILICATE 10 G/10G
10 POWDER, FOR SUSPENSION ORAL ONCE
Refills: 0 | Status: COMPLETED | OUTPATIENT
Start: 2023-08-22 | End: 2023-08-22

## 2023-08-22 RX ORDER — AMLODIPINE BESYLATE 2.5 MG/1
10 TABLET ORAL DAILY
Refills: 0 | Status: DISCONTINUED | OUTPATIENT
Start: 2023-08-22 | End: 2023-08-22

## 2023-08-22 RX ADMIN — SODIUM CHLORIDE 100 MILLILITER(S): 9 INJECTION, SOLUTION INTRAVENOUS at 20:00

## 2023-08-22 RX ADMIN — HEPARIN SODIUM 5000 UNIT(S): 5000 INJECTION INTRAVENOUS; SUBCUTANEOUS at 21:04

## 2023-08-22 RX ADMIN — Medication 50 MILLILITER(S): at 07:10

## 2023-08-22 RX ADMIN — HYDROMORPHONE HYDROCHLORIDE 0.5 MILLIGRAM(S): 2 INJECTION INTRAMUSCULAR; INTRAVENOUS; SUBCUTANEOUS at 07:02

## 2023-08-22 RX ADMIN — HEPARIN SODIUM 5000 UNIT(S): 5000 INJECTION INTRAVENOUS; SUBCUTANEOUS at 06:07

## 2023-08-22 RX ADMIN — HEPARIN SODIUM 5000 UNIT(S): 5000 INJECTION INTRAVENOUS; SUBCUTANEOUS at 13:41

## 2023-08-22 RX ADMIN — Medication 1 PATCH: at 13:41

## 2023-08-22 RX ADMIN — Medication 10 MILLIGRAM(S): at 00:20

## 2023-08-22 RX ADMIN — HYDROMORPHONE HYDROCHLORIDE 0.5 MILLIGRAM(S): 2 INJECTION INTRAMUSCULAR; INTRAVENOUS; SUBCUTANEOUS at 05:55

## 2023-08-22 RX ADMIN — Medication 1 PATCH: at 19:35

## 2023-08-22 RX ADMIN — INSULIN HUMAN 5 UNIT(S): 100 INJECTION, SOLUTION SUBCUTANEOUS at 07:09

## 2023-08-22 RX ADMIN — TAMSULOSIN HYDROCHLORIDE 0.4 MILLIGRAM(S): 0.4 CAPSULE ORAL at 21:04

## 2023-08-22 RX ADMIN — SODIUM ZIRCONIUM CYCLOSILICATE 10 GRAM(S): 10 POWDER, FOR SUSPENSION ORAL at 10:56

## 2023-08-22 RX ADMIN — ATORVASTATIN CALCIUM 80 MILLIGRAM(S): 80 TABLET, FILM COATED ORAL at 21:04

## 2023-08-22 NOTE — PROGRESS NOTE ADULT - ASSESSMENT
67 y/o male with PMH of  TIA,  HTN came to the ED complaining of LE edema x 1 week with associated lower abdominal pain around his waist, decrease urination, dyspnea on exertion, orthopnea, chills and decrease appetite. In the ED, found to be in JUANJO, with hyperkalemia.     1-JUANJO with Hyperkalemia   had ca gluconate , loeklma in [facility] ED   repeat K 5.7 this am   repeat lokelma given   monitor lytes , cr   nephrology consult appreciated   cont IVF with Na bicarbonate    No ECG changes   Will repeat BMP   Boo placed monitor output , Ct with b/l hydro   kidney US r/o obstructive uropathy   add flomax       2-B/l LE edema with dyspnea on exertion   BNP: 20122  Will check echo to assess cardiac structural/functional status   Will get cardiology consult as needed base on echo result     3-HTN/TIA   Patient has not taken medications in 8 months   Amlodipine 10mg   Will hold Lisinopril due to ARF   Atorvastatin 80mg   asa     4- Urinary retention   cont boo over 2 liter urine drained per Hand p last night with boo insertion   US of bladder kidney   cont flomax started 8/21     5-Nicotine dependence   Nicotine patch   Cessation advised       DVT prophylaxis: Heparin sc   Diet: cardiac low salt   may need HD if no improvement     cont tele     Plan of care discussed with patient

## 2023-08-22 NOTE — CONSULT NOTE ADULT - ASSESSMENT
UROLOGY CONSULT PLACED. 69 y/o male with PMH of  TIA,  HTN came to the ED complaining of gradual progressive LE edema. Labs significant for JUANJO, hyperK and metabolic acidosis. CT w/ mod B/l HDN.    1. Acute kidney injury on CKD, NOS:  -JUANJO likely from obstructive uropathy     -Baseline SCr: unclear, SCr on admission 11.18 and today 11.87 mg/dl  -UA: will order UA, urine lytes  -Imaging: Moderate bilateral hydroureteronephrosis to the level of the mid/distal ureters without obstructing stone identified. Bilateral perinephric stranding. Distended bladder with mild wall thickening and numerous diverticula. Rec correlation with urinalysis. Bateman catheter misplaced with balloon in the penile urethra- rec repositioning.    -Bateman repositioned and patient is making great UOP  -Will add Flomax, Urology consult placed.   -No emergent need of HD, will trend SCr.    2.Hyperkalemia:  -JUANJO,MA. 5.7 this AM  -Will give another 10 mg of Lokelma  -Expecting to improve  -repeat BMP in evening ordered for 1700    3. Metabolic acidosis:  -JUANJO, agree w/ bicarb containing gtt as ordered.    4. Anemia: will order w/u.    5. Volume overload: will improve w/ improvement in JUANJO.

## 2023-08-22 NOTE — ED ADULT NURSE REASSESSMENT NOTE - NS ED NURSE REASSESS COMMENT FT1
Pt resting quietly in stretcher awaiting Admission. No new orders at this time pt safety maintained. VSS

## 2023-08-22 NOTE — PROGRESS NOTE ADULT - SUBJECTIVE AND OBJECTIVE BOX
Follow up for ARF     Patient seen and examined at bedside. No overnight events reported  He is feeling better   chart reviewed       ALLERGIES:  No Known Allergies    MEDICATIONS  (STANDING):  atorvastatin 80 milliGRAM(s) Oral at bedtime  heparin   Injectable 5000 Unit(s) SubCutaneous every 8 hours  nicotine - 21 mG/24Hr(s) Patch 1 Patch Transdermal daily  sodium chloride 0.45% 1000 milliLiter(s) (100 mL/Hr) IV Continuous <Continuous>  tamsulosin 0.4 milliGRAM(s) Oral at bedtime    MEDICATIONS  (PRN):  acetaminophen     Tablet .. 650 milliGRAM(s) Oral every 6 hours PRN Temp greater or equal to 38C (100.4F), Mild Pain (1 - 3)  aluminum hydroxide/magnesium hydroxide/simethicone Suspension 30 milliLiter(s) Oral every 4 hours PRN Dyspepsia  melatonin 3 milliGRAM(s) Oral at bedtime PRN Insomnia  ondansetron Injectable 4 milliGRAM(s) IV Push every 8 hours PRN Nausea and/or Vomiting    Vital Signs Last 24 Hrs  T(F): 98.3 (22 Aug 2023 11:12), Max: 98.3 (22 Aug 2023 08:30)  HR: 84 (22 Aug 2023 11:12) (83 - 106)  BP: 126/74 (22 Aug 2023 11:12) (126/74 - 176/84)  RR: 18 (22 Aug 2023 11:12) (18 - 18)  SpO2: 99% (22 Aug 2023 08:30) (95% - 99%)  I&O's Summary    21 Aug 2023 07:01  -  22 Aug 2023 07:00  --------------------------------------------------------  IN: 0 mL / OUT: 2500 mL / NET: -2500 mL        PHYSICAL EXAM:  General: awake alert   Neck: supple no JVD   Lungs: CTA bilateral   Cardio: RRR, S1/S2, No murmur  Abdomen: Soft, Nontender, Nondistended; Bowel sounds present  Extremities: No calf tenderness, No pitting edema    LABS:                        10.3   7.35  )-----------( 307      ( 22 Aug 2023 05:17 )             31.0     08-22    136  |  100  |  65.6  ----------------------------<  89  5.7   |  16.0  |  11.87    Ca    9.0      22 Aug 2023 05:17    TPro  6.9  /  Alb  3.6  /  TBili  0.4  /  DBili  x   /  AST  24  /  ALT  13  /  AlkPhos  86  08-21                          ABG - ( 21 Aug 2023 23:15 )  pH, Arterial: 7.370 pH, Blood: x     /  pCO2: 29    /  pO2: 95    / HCO3: 17    / Base Excess: -8.5  /  SaO2: 99.7                    POCT Blood Glucose.: 165 mg/dL (22 Aug 2023 14:48)  POCT Blood Glucose.: 299 mg/dL (22 Aug 2023 14:46)  POCT Blood Glucose.: 124 mg/dL (22 Aug 2023 11:49)  POCT Blood Glucose.: 104 mg/dL (22 Aug 2023 09:53)  POCT Blood Glucose.: 118 mg/dL (22 Aug 2023 08:10)  POCT Blood Glucose.: 100 mg/dL (22 Aug 2023 07:09)  POCT Blood Glucose.: 105 mg/dL (22 Aug 2023 01:05)  POCT Blood Glucose.: 104 mg/dL (21 Aug 2023 21:52)      Urinalysis Basic - ( 22 Aug 2023 05:17 )    Color: x / Appearance: x / SG: x / pH: x  Gluc: 89 mg/dL / Ketone: x  / Bili: x / Urobili: x   Blood: x / Protein: x / Nitrite: x   Leuk Esterase: x / RBC: x / WBC x   Sq Epi: x / Non Sq Epi: x / Bacteria: x          RADIOLOGY & ADDITIONAL TESTS:

## 2023-08-22 NOTE — CONSULT NOTE ADULT - SUBJECTIVE AND OBJECTIVE BOX
HPI: 69 y/o male with PMH of  TIA,  HTN came to the ED complaining of gradual progressive LE edema now extending to his abdomen. Reported lower abdominal pain around his waist, decrease urination, dyspnea on exertion, orthopnea, chills and decrease appetite in the last 4 days. Of note, patient said he has not taken his medications in about 8 months because he ran out and could not secure an appointment with his PCP. He denied increased urination frequency, dysuria, gross hematuria, fever, chills, skin rash or itching, nausea, vomiting, diarrhea, bleeding problems and joint pain or swelling. Review of other systems was negative.    In ER initial labs significant for hyperK 7, MA CO2 15, BUN 62 and SCR 11. Patient had UOP of only 50 cc after initial insertion of Bateman catheter.     CT abd/pelvis: Small bilateral pleural effusions. Mild edema at the lung bases. Moderate bilateral hydroureteronephrosis to the level of the mid/distal ureters without obstructing stone identified. Bilateral perinephric stranding. Distended bladder with mild wall thickening and numerous diverticula. Rec correlation with urinalysis. Bateman catheter misplaced with balloon in the penile urethra- rec repositioning.    Patient was given medical cocktail for hyperkalemia and started on NaHCO3 gtt.    PAST MEDICAL & SURGICAL HISTORY:  HTN (hypertension)  H/O left inguinal hernia repair  H/O knee surgery    FAMILY HISTORY:  Family history of lung cancer (Father, Sibling)    SOCIAL HISTORY:  Denies tobacco, alcohol, drug abuse.     MEDICATIONS  (STANDING):  atorvastatin 80 milliGRAM(s) Oral at bedtime  heparin   Injectable 5000 Unit(s) SubCutaneous every 8 hours  nicotine - 21 mG/24Hr(s) Patch 1 Patch Transdermal daily  sodium chloride 0.45% 1000 milliLiter(s) (100 mL/Hr) IV Continuous <Continuous>    MEDICATIONS  (PRN):  acetaminophen     Tablet .. 650 milliGRAM(s) Oral every 6 hours PRN Temp greater or equal to 38C (100.4F), Mild Pain (1 - 3)  aluminum hydroxide/magnesium hydroxide/simethicone Suspension 30 milliLiter(s) Oral every 4 hours PRN Dyspepsia  melatonin 3 milliGRAM(s) Oral at bedtime PRN Insomnia  ondansetron Injectable 4 milliGRAM(s) IV Push every 8 hours PRN Nausea and/or Vomiting    Allergies:  No Known Allergies    REVIEW OF SYSTEMS: All systems were reviewed in detail. Pertinent positive and negative have been detailed in HPI, otherwise negative.     Vital Signs Last 24 Hrs  T(C): 36.7 (21 Aug 2023 23:37), Max: 36.9 (21 Aug 2023 14:16)  T(F): 98.1 (21 Aug 2023 23:37), Max: 98.4 (21 Aug 2023 14:16)  HR: 83 (22 Aug 2023 01:09) (83 - 110)  BP: 143/82 (22 Aug 2023 01:09) (143/82 - 176/84)  BP(mean): --  RR: 18 (21 Aug 2023 23:37) (17 - 18)  SpO2: 95% (21 Aug 2023 23:37) (95% - 99%)    Parameters below as of 21 Aug 2023 23:37  Patient On (Oxygen Delivery Method): room air    PHYSICAL EXAM:  Gen: no acute distress  MS: alert, conversing normally  Eyes: EOMI, no icterus  HENT: NCAT, MMM  CV: rhythm reg reg, rate normal, no m/g/r, no LE edema  Chest: CTAB, no w/r/r,  Abd: soft, NT, ND  Neuro: moving all 4 limbs spontaneously, no tremor  MSK: normal bulk and tone, no joint swelling  Skin: dry, warm, no rash or jaundice    LABS:                        10.3   7.35  )-----------( 307      ( 22 Aug 2023 05:17 )             31.0     08-22    136  |  100  |  65.6<H>  ----------------------------<  89  5.7<H>   |  16.0<L>  |  11.87<H>    Ca    9.0      22 Aug 2023 05:17    TPro  6.9  /  Alb  3.6  /  TBili  0.4  /  DBili  x   /  AST  24  /  ALT  13  /  AlkPhos  86  08-21    Urinalysis Basic - ( 22 Aug 2023 05:17 )  Color: x / Appearance: x / SG: x / pH: x  Gluc: 89 mg/dL / Ketone: x  / Bili: x / Urobili: x   Blood: x / Protein: x / Nitrite: x   Leuk Esterase: x / RBC: x / WBC x   Sq Epi: x / Non Sq Epi: x / Bacteria: x    RADIOLOGY & ADDITIONAL TESTS: Reviewed  CT abd/pelvis: Small bilateral pleural effusions. Mild edema at the lung bases. Moderate bilateral hydroureteronephrosis to the level of the mid/distal ureters without obstructing stone identified. Bilateral perinephric stranding. Distended bladder with mild wall thickening and numerous diverticula. Rec correlation with urinalysis. Bateman catheter misplaced with balloon in the penile urethra- rec repositioning. HPI: 67 y/o male with PMH of  TIA,  HTN came to the ED complaining of gradual progressive LE edema now extending to his abdomen. Reported lower abdominal pain around his waist, decrease urination, dyspnea on exertion, orthopnea, chills and decrease appetite in the last 4 days. Of note, patient said he has not taken his medications in about 8 months because he ran out and could not secure an appointment with his PCP. He denied increased urination frequency, dysuria, gross hematuria, fever, chills, skin rash or itching, nausea, vomiting, diarrhea, bleeding problems and joint pain or swelling. Review of other systems was negative.    In ER initial labs significant for hyperK 7, MA CO2 15, BUN 62 and SCR 11. Patient had UOP of only 50 cc after initial insertion of Bateman catheter.     CT abd/pelvis: Small bilateral pleural effusions. Mild edema at the lung bases. Moderate bilateral hydroureteronephrosis to the level of the mid/distal ureters without obstructing stone identified. Bilateral perinephric stranding. Distended bladder with mild wall thickening and numerous diverticula. Rec correlation with urinalysis. Bateman catheter misplaced with balloon in the penile urethra- rec repositioning.    Patient was given medical cocktail for hyperkalemia and started on NaHCO3 gtt.    PAST MEDICAL & SURGICAL HISTORY:  HTN (hypertension)  H/O left inguinal hernia repair  H/O knee surgery    FAMILY HISTORY:  Family history of lung cancer (Father, Sibling)    SOCIAL HISTORY:  Denies tobacco, alcohol, drug abuse.     MEDICATIONS  (STANDING):  atorvastatin 80 milliGRAM(s) Oral at bedtime  heparin   Injectable 5000 Unit(s) SubCutaneous every 8 hours  nicotine - 21 mG/24Hr(s) Patch 1 Patch Transdermal daily  sodium chloride 0.45% 1000 milliLiter(s) (100 mL/Hr) IV Continuous <Continuous>    MEDICATIONS  (PRN):  acetaminophen     Tablet .. 650 milliGRAM(s) Oral every 6 hours PRN Temp greater or equal to 38C (100.4F), Mild Pain (1 - 3)  aluminum hydroxide/magnesium hydroxide/simethicone Suspension 30 milliLiter(s) Oral every 4 hours PRN Dyspepsia  melatonin 3 milliGRAM(s) Oral at bedtime PRN Insomnia  ondansetron Injectable 4 milliGRAM(s) IV Push every 8 hours PRN Nausea and/or Vomiting    Allergies:  No Known Allergies    REVIEW OF SYSTEMS: All systems were reviewed in detail. Pertinent positive and negative have been detailed in HPI, otherwise negative.     Vital Signs Last 24 Hrs  T(C): 36.7 (21 Aug 2023 23:37), Max: 36.9 (21 Aug 2023 14:16)  T(F): 98.1 (21 Aug 2023 23:37), Max: 98.4 (21 Aug 2023 14:16)  HR: 83 (22 Aug 2023 01:09) (83 - 110)  BP: 143/82 (22 Aug 2023 01:09) (143/82 - 176/84)  BP(mean): --  RR: 18 (21 Aug 2023 23:37) (17 - 18)  SpO2: 95% (21 Aug 2023 23:37) (95% - 99%)    Parameters below as of 21 Aug 2023 23:37  Patient On (Oxygen Delivery Method): room air    PHYSICAL EXAM:  Gen: no acute distress  MS: alert, conversing normally  Eyes: EOMI, no icterus  HENT: NCAT, MMM  CV: rhythm reg reg, rate normal, no m/g/r, + LE edema  Chest: CTAB, no w/r/r,  Abd: soft, NT, ND  MSK: normal bulk and tone, no joint swelling  Skin: dry, warm, no rash or jaundice    LABS:                        10.3   7.35  )-----------( 307      ( 22 Aug 2023 05:17 )             31.0     08-22    136  |  100  |  65.6<H>  ----------------------------<  89  5.7<H>   |  16.0<L>  |  11.87<H>    Ca    9.0      22 Aug 2023 05:17    TPro  6.9  /  Alb  3.6  /  TBili  0.4  /  DBili  x   /  AST  24  /  ALT  13  /  AlkPhos  86  08-21    Urinalysis Basic - ( 22 Aug 2023 05:17 )  Color: x / Appearance: x / SG: x / pH: x  Gluc: 89 mg/dL / Ketone: x  / Bili: x / Urobili: x   Blood: x / Protein: x / Nitrite: x   Leuk Esterase: x / RBC: x / WBC x   Sq Epi: x / Non Sq Epi: x / Bacteria: x    RADIOLOGY & ADDITIONAL TESTS: Reviewed  CT abd/pelvis: Small bilateral pleural effusions. Mild edema at the lung bases. Moderate bilateral hydroureteronephrosis to the level of the mid/distal ureters without obstructing stone identified. Bilateral perinephric stranding. Distended bladder with mild wall thickening and numerous diverticula. Rec correlation with urinalysis. Bateman catheter misplaced with balloon in the penile urethra- rec repositioning.

## 2023-08-23 LAB
ANION GAP SERPL CALC-SCNC: 11 MMOL/L — SIGNIFICANT CHANGE UP (ref 5–17)
APPEARANCE UR: CLEAR — SIGNIFICANT CHANGE UP
BACTERIA # UR AUTO: ABNORMAL
BILIRUB UR-MCNC: NEGATIVE — SIGNIFICANT CHANGE UP
BUN SERPL-MCNC: 23.9 MG/DL — HIGH (ref 8–20)
CALCIUM SERPL-MCNC: 8.8 MG/DL — SIGNIFICANT CHANGE UP (ref 8.4–10.5)
CHLORIDE SERPL-SCNC: 109 MMOL/L — HIGH (ref 96–108)
CO2 SERPL-SCNC: 23 MMOL/L — SIGNIFICANT CHANGE UP (ref 22–29)
COLOR SPEC: YELLOW — SIGNIFICANT CHANGE UP
CREAT ?TM UR-MCNC: 43 MG/DL — SIGNIFICANT CHANGE UP
CREAT SERPL-MCNC: 2.17 MG/DL — HIGH (ref 0.5–1.3)
DIFF PNL FLD: ABNORMAL
EGFR: 33 ML/MIN/1.73M2 — LOW
EPI CELLS # UR: SIGNIFICANT CHANGE UP
FERRITIN SERPL-MCNC: 160 NG/ML — SIGNIFICANT CHANGE UP (ref 30–400)
GLUCOSE SERPL-MCNC: 99 MG/DL — SIGNIFICANT CHANGE UP (ref 70–99)
GLUCOSE UR QL: NEGATIVE MG/DL — SIGNIFICANT CHANGE UP
IRON SATN MFR SERPL: 14 % — LOW (ref 16–55)
IRON SATN MFR SERPL: 29 UG/DL — LOW (ref 59–158)
KETONES UR-MCNC: NEGATIVE — SIGNIFICANT CHANGE UP
LEUKOCYTE ESTERASE UR-ACNC: ABNORMAL
NITRITE UR-MCNC: NEGATIVE — SIGNIFICANT CHANGE UP
PH UR: 8 — SIGNIFICANT CHANGE UP (ref 5–8)
POTASSIUM SERPL-MCNC: 4.6 MMOL/L — SIGNIFICANT CHANGE UP (ref 3.5–5.3)
POTASSIUM SERPL-SCNC: 4.6 MMOL/L — SIGNIFICANT CHANGE UP (ref 3.5–5.3)
PROT ?TM UR-MCNC: 48 MG/DL — HIGH (ref 0–12)
PROT UR-MCNC: 100
PROT/CREAT UR-RTO: 1.1 RATIO — HIGH
RBC CASTS # UR COMP ASSIST: ABNORMAL /HPF (ref 0–4)
SODIUM SERPL-SCNC: 143 MMOL/L — SIGNIFICANT CHANGE UP (ref 135–145)
SODIUM UR-SCNC: 92 MMOL/L — SIGNIFICANT CHANGE UP
SP GR SPEC: 1.01 — SIGNIFICANT CHANGE UP (ref 1.01–1.02)
TIBC SERPL-MCNC: 200 UG/DL — LOW (ref 220–430)
TRANSFERRIN SERPL-MCNC: 140 MG/DL — LOW (ref 180–329)
UROBILINOGEN FLD QL: NEGATIVE MG/DL — SIGNIFICANT CHANGE UP
WBC UR QL: SIGNIFICANT CHANGE UP /HPF (ref 0–5)

## 2023-08-23 PROCEDURE — 99232 SBSQ HOSP IP/OBS MODERATE 35: CPT

## 2023-08-23 PROCEDURE — 99233 SBSQ HOSP IP/OBS HIGH 50: CPT

## 2023-08-23 RX ORDER — HYDRALAZINE HCL 50 MG
25 TABLET ORAL THREE TIMES A DAY
Refills: 0 | Status: DISCONTINUED | OUTPATIENT
Start: 2023-08-23 | End: 2023-08-25

## 2023-08-23 RX ORDER — CARVEDILOL PHOSPHATE 80 MG/1
6.25 CAPSULE, EXTENDED RELEASE ORAL EVERY 12 HOURS
Refills: 0 | Status: DISCONTINUED | OUTPATIENT
Start: 2023-08-23 | End: 2023-08-25

## 2023-08-23 RX ADMIN — HEPARIN SODIUM 5000 UNIT(S): 5000 INJECTION INTRAVENOUS; SUBCUTANEOUS at 13:38

## 2023-08-23 RX ADMIN — Medication 1 PATCH: at 20:00

## 2023-08-23 RX ADMIN — CARVEDILOL PHOSPHATE 6.25 MILLIGRAM(S): 80 CAPSULE, EXTENDED RELEASE ORAL at 17:48

## 2023-08-23 RX ADMIN — Medication 1 PATCH: at 12:26

## 2023-08-23 RX ADMIN — HEPARIN SODIUM 5000 UNIT(S): 5000 INJECTION INTRAVENOUS; SUBCUTANEOUS at 04:43

## 2023-08-23 RX ADMIN — Medication 81 MILLIGRAM(S): at 08:20

## 2023-08-23 RX ADMIN — ATORVASTATIN CALCIUM 80 MILLIGRAM(S): 80 TABLET, FILM COATED ORAL at 21:45

## 2023-08-23 RX ADMIN — Medication 650 MILLIGRAM(S): at 02:05

## 2023-08-23 RX ADMIN — Medication 1 PATCH: at 08:22

## 2023-08-23 RX ADMIN — TAMSULOSIN HYDROCHLORIDE 0.4 MILLIGRAM(S): 0.4 CAPSULE ORAL at 21:45

## 2023-08-23 RX ADMIN — HEPARIN SODIUM 5000 UNIT(S): 5000 INJECTION INTRAVENOUS; SUBCUTANEOUS at 21:45

## 2023-08-23 RX ADMIN — Medication 25 MILLIGRAM(S): at 13:38

## 2023-08-23 RX ADMIN — Medication 650 MILLIGRAM(S): at 01:08

## 2023-08-23 RX ADMIN — Medication 1 PATCH: at 12:21

## 2023-08-23 RX ADMIN — Medication 25 MILLIGRAM(S): at 21:45

## 2023-08-23 NOTE — CONSULT NOTE ADULT - ASSESSMENT
Assessment: 68 year old male admitted for renal failure with hyperkalemia 2/2 urinary retention.    Plan:  Bateman to remain in place at all times.  Monitor UOP and trend renal function  To be discharged with Bateman catheter and follow up within 1 week of discharge with Dr. Liz as outpatient for further studies to identify cause of urinary retention.  Discussed w/ Dr. Liz

## 2023-08-23 NOTE — CONSULT NOTE ADULT - SUBJECTIVE AND OBJECTIVE BOX
[facility] - [facility], [facility]                                   [address] [place] [address]                                                      PHONE: [phone]                                                         FAX: [phone]  [email or web address]  ---------------------------------------------------------------------------------------------------------------------------------    Reason for Consult: HFrEF     HPI:  MARIANO REEVES is an 68y Male HTN HDL CVA who presents to [facility] dyspnea orthopnea weight gain and complaining of LE edema x 1 week. Patient first noticed swelling of the LLE but later both extremities extending to his abdomen. Reported lower abdominal pain around his waist, decrease urination, dyspnea on exertion, orthopnea.  Patient said he has not taken his medications in about 8 months because he ran out and could not secure an appointment with his PCP. He has no chest pain, nausea, vomiting, palpitation, fever, recent travel, calf pain, HA.  Patient found to have JUANJO likely due to obstructive  uropathy improving.          PAST MEDICAL & SURGICAL HISTORY:  HTN (hypertension)      H/O left inguinal hernia repair      H/O knee surgery          No Known Allergies      MEDICATIONS  (STANDING):  aspirin enteric coated 81 milliGRAM(s) Oral daily  atorvastatin 80 milliGRAM(s) Oral at bedtime  carvedilol 6.25 milliGRAM(s) Oral every 12 hours  heparin   Injectable 5000 Unit(s) SubCutaneous every 8 hours  hydrALAZINE 25 milliGRAM(s) Oral three times a day  nicotine - 21 mG/24Hr(s) Patch 1 Patch Transdermal daily  tamsulosin 0.4 milliGRAM(s) Oral at bedtime    MEDICATIONS  (PRN):  acetaminophen     Tablet .. 650 milliGRAM(s) Oral every 6 hours PRN Temp greater or equal to 38C (100.4F), Mild Pain (1 - 3)  aluminum hydroxide/magnesium hydroxide/simethicone Suspension 30 milliLiter(s) Oral every 4 hours PRN Dyspepsia  melatonin 3 milliGRAM(s) Oral at bedtime PRN Insomnia  ondansetron Injectable 4 milliGRAM(s) IV Push every 8 hours PRN Nausea and/or Vomiting      Social History:  Cigarettes:        active smoker            Alchohol:        none         Illicit Drug Abuse:  none    ROS: Negative other than as mentioned in HPI.    Vital Signs Last 24 Hrs  T(C): 36.8 (23 Aug 2023 07:41), Max: 37 (22 Aug 2023 20:14)  T(F): 98.3 (23 Aug 2023 07:41), Max: 98.6 (22 Aug 2023 20:14)  HR: 98 (23 Aug 2023 07:41) (84 - 102)  BP: 155/66 (23 Aug 2023 07:41) (116/71 - 155/66)  BP(mean): --  RR: 18 (23 Aug 2023 07:41) (17 - 18)  SpO2: 98% (23 Aug 2023 07:41) (93% - 99%)    Parameters below as of 23 Aug 2023 07:41  Patient On (Oxygen Delivery Method): room air      ICU Vital Signs Last 24 Hrs  MARIANO LALA  I&O's Detail    22 Aug 2023 07:01  -  23 Aug 2023 07:00  --------------------------------------------------------  IN:  Total IN: 0 mL    OUT:    Voided (mL): 1300 mL  Total OUT: 1300 mL    Total NET: -1300 mL        I&O's Summary    22 Aug 2023 07:01  -  23 Aug 2023 07:00  --------------------------------------------------------  IN: 0 mL / OUT: 1300 mL / NET: -1300 mL      Drug Dosing Weight  MARIANO HODGEANDO      PHYSICAL EXAM:  General: Appears well developed, alert and cooperative.  HEENT: Head; normocephalic, atraumatic.  Eyes: Pupils reactive, cornea wnl.  Neck: Supple, no nodes adenopathy, no NVD or carotid bruit or thyromegaly.  CARDIOVASCULAR: Normal S1 and S2, 1/6 murmur, rub, gallop or lift.   LUNGS: No rales, rhonchi or wheeze. Normal breath sounds bilaterally.  ABDOMEN: Soft, nontender without mass or organomegaly. bowel sounds normoactive.  EXTREMITIES: No clubbing, cyanosis or edema. Distal pulses wnl.   SKIN: warm and dry with normal turgor.  NEURO: Alert/oriented x 3/normal motor exam. No pathologic reflexes.    PSYCH: normal affect.        LABS:                        10.3   7.35  )-----------( 307      ( 22 Aug 2023 05:17 )             31.0     08-23    143  |  109<H>  |  23.9<H>  ----------------------------<  99  4.6   |  23.0  |  2.17<H>    Ca    8.8      23 Aug 2023 07:42    TPro  6.9  /  Alb  3.6  /  TBili  0.4  /  DBili  x   /  AST  24  /  ALT  13  /  AlkPhos  86  08-21    MARIANO REEVES  CARDIAC MARKERS ( 21 Aug 2023 19:50 )  x     / 0.03 ng/mL / x     / x     / x            Urinalysis Basic - ( 23 Aug 2023 07:42 )    Color: x / Appearance: x / SG: x / pH: x  Gluc: 99 mg/dL / Ketone: x  / Bili: x / Urobili: x   Blood: x / Protein: x / Nitrite: x   Leuk Esterase: x / RBC: x / WBC x   Sq Epi: x / Non Sq Epi: x / Bacteria: x        RADIOLOGY & ADDITIONAL STUDIES:    INTERPRETATION OF TELEMETRY (personally reviewed):    ECG:    < from: 12 Lead ECG (08.21.23 @ 21:08) >  Diagnosis Line *** Poor data quality, interpretation may be adversely affected  Sinus tachycardia  Nonspecific T wave abnormality  Abnormal ECG    Confirmed by Po Sahni ([id number]) on 8/22/2023 2:04:53 PM    < end of copied text >    < from: CT Abdomen and Pelvis No Cont (08.22.23 @ 04:06) >  ACC: [id number] EXAM:  CT ABDOMEN AND PELVIS   ORDERED BY: NELL JACKMAN     PROCEDURE DATE:  08/22/2023          INTERPRETATION:  CLINICAL INFORMATION: Lower abdominal pain, decreased   urination.  Acute kidney injury.    COMPARISON: CT scan abdomen pelvis 3/23/2012.    CONTRAST/COMPLICATIONS:  IV Contrast: None.  Oral Contrast: None.  Complications: None reported at time of exam completion.    PROCEDURE:  CT of the Abdomen and Pelvis was performed.  Sagittal and coronal reformats were performed.    FINDINGS:    LOWER CHEST:  Bilateral pleural effusions, right greater than left.  Interlobular septal thickening likely reflecting interstitial edema.    Evaluation of the solid organ parenchyma is limited without intravenous   contrast.    LIVER: Calcified granuloma right hepatic lobe.  BILE DUCTS: Normal caliber.  GALLBLADDER: Within normal limits.  SPLEEN: Within normal limits.  PANCREAS: Within normal limits.  ADRENALS: Within normal limits.  KIDNEYS/URETERS:  Moderate bilateral hydroureteronephrosis to the level of the mid/distal   ureters without obstructing calculus identified.  Bilateral perinephric stranding.    BLADDER: Markedly distended urinary bladder with mild bladder wall   thickening and several large bladder diverticuli.  REPRODUCTIVE ORGANS: Balloon Bateman catheter inflated at the penile   urethra.    BOWEL:  Retained contrast within the colon; no bowel obstruction.  Appendix is normal.  PERITONEUM: No ascites.    VESSELS: Atherosclerotic changes.  RETROPERITONEUM/LYMPH NODES: No lymphadenopathy.    ABDOMINAL WALL:  Small fat-containing right inguinal hernia.    BONES:  Degenerative changes.  Age indeterminate compression deformity L1.    IMPRESSION:    Moderate bilateral hydroureteronephrosis without obstructing ureteral   calculus demonstrated.  Bilateral perinephric stranding.  Correlate clinically for urinary tract infection.    Distended urinary bladder.  Bateman catheter balloon inflated at the level of the penile urethra.  Recommend repositioning.    Other findings as discussed above.    This study was preliminary reported by the ED radiologist on 8/22/2023,   4:37 AM.          --- End of Report ---    < end of copied text >      < from: TTE Echo Complete w/ Contrast w/ Doppler (08.22.23 @ 18:36) >    Summary:   1. Left ventricular ejection fraction, by visual estimation, is 20 to   25%.   2. Severely decreased global left ventricular systolic function.   3. Spectral Doppler shows pseudonormal pattern of left ventricular   myocardial filling (Grade II diastolic dysfunction).   4. Mildy enlarged right ventricle with mildly reduced RV systolic   function.   5. Estimated pulmonary artery systolic pressure is 49.0 mmHg assuming a   right atrial pressure of 8 mmHg, which is consistent with mild pulmonary   hypertension.   6. Severely enlarged left atrium.   7. Moderate mitral valve regurgitation.   8. The mitral valveleaflets are tethered which is due to reduced   systolic function and elevated LVDP.   9. Mild-moderate tricuspid regurgitation.  10. Mild pulmonic valve regurgitation.  11. Trivial pericardial effusion.  12. Compared to the prior TTE study from 10/5/17, biventricular systolic   dysfunction present.    Mayur Hinojosa DO Electronically signed on 8/23/2023 at 8:40:35 AM    < end of copied text >        Assessment and Plan:  In summary, MARIANO REEVES is an 68y Male with past medical history significant for HTN HDL CVA who presents to [facility] dyspnea orthopnea weight gain and complaining of LE edema x 1 week. Patient first noticed swelling of the LLE but later both extremities extending to his abdomen. Reported lower abdominal pain around his waist, decrease urination, dyspnea on exertion, orthopnea.  Patient said he has not taken his medications in about 8 months because he ran out and could not secure an appointment with his PCP. He has no chest pain, nausea, vomiting, palpitation, fever, recent travel, calf pain, HA.  Patient found to have JUANJO likely due to obstructive  uropathy improving.      TTE EF 20 to 25% DDD II mild PHTN LA severly enlarged moderate MR compared to prior TTE 2017 new biventricular systolic failure       Plan  Monitor on TELE   Coreg 6.25 mg po BID   Hydralazine 25 mg po TID   ASA and statin therapy   Awaiting Urology consult   Right and Left heart cath without LV gram when ok with Renal

## 2023-08-23 NOTE — PROGRESS NOTE ADULT - SUBJECTIVE AND OBJECTIVE BOX
Reason for visit: JUANJO    Subjective: No acute overnight event. Great UOP.     ROS: All systems were reviewed in detail pertinent positive and negative mentioned above, rest are negative.    PHYSICAL EXAM:  Gen: no acute distress  MS: alert, conversing normally  Eyes: EOMI, no icterus  HENT: NCAT, MMM  CV: rhythm reg reg, rate normal, no m/g/r, + LE edema  Chest: CTAB, no w/r/r,  Abd: soft, NT, ND  MSK: normal bulk and tone, no joint swelling  Skin: dry, warm, no rash or jaundice    LABS:  08-23    143  |  109  |  23.9<H>  ----------------------------<  99  4.6   |  23.0 |  2.17<H>    Urinalysis Basic - ( 22 Aug 2023 05:17 )  Color: x / Appearance: x / SG: x / pH: x  Gluc: 89 mg/dL / Ketone: x  / Bili: x / Urobili: x   Blood: x / Protein: x / Nitrite: x   Leuk Esterase: x / RBC: x / WBC x   Sq Epi: x / Non Sq Epi: x / Bacteria: x    RADIOLOGY & ADDITIONAL TESTS: Reviewed  CT abd/pelvis: Small bilateral pleural effusions. Mild edema at the lung bases. Moderate bilateral hydroureteronephrosis to the level of the mid/distal ureters without obstructing stone identified. Bilateral perinephric stranding. Distended bladder with mild wall thickening and numerous diverticula. Rec correlation with urinalysis. Bateman catheter misplaced with balloon in the penile urethra- rec repositioning.

## 2023-08-23 NOTE — PATIENT PROFILE ADULT - HISTORY OF COVID-19 VACCINATION
Information provided for proper sleep hygiene.  Minimize caffeine as much as possible.  Avoid working or using iPad/iPhone/computer 2 to 3 hours before bedtime.  Trial of trazodone 50 mg nightly.  Strongly suggested looking into sleep study to rule out sleep apnea.   Vaccine status unknown

## 2023-08-23 NOTE — PROGRESS NOTE ADULT - SUBJECTIVE AND OBJECTIVE BOX
seen for renal failure, high k and CHF    feels better  ros negative  no chest pain/sob    MEDICATIONS  (STANDING):  aspirin enteric coated 81 milliGRAM(s) Oral daily  atorvastatin 80 milliGRAM(s) Oral at bedtime  carvedilol 6.25 milliGRAM(s) Oral every 12 hours  heparin   Injectable 5000 Unit(s) SubCutaneous every 8 hours  hydrALAZINE 25 milliGRAM(s) Oral three times a day  nicotine - 21 mG/24Hr(s) Patch 1 Patch Transdermal daily  tamsulosin 0.4 milliGRAM(s) Oral at bedtime    MEDICATIONS  (PRN):  acetaminophen     Tablet .. 650 milliGRAM(s) Oral every 6 hours PRN Temp greater or equal to 38C (100.4F), Mild Pain (1 - 3)  aluminum hydroxide/magnesium hydroxide/simethicone Suspension 30 milliLiter(s) Oral every 4 hours PRN Dyspepsia  melatonin 3 milliGRAM(s) Oral at bedtime PRN Insomnia  ondansetron Injectable 4 milliGRAM(s) IV Push every 8 hours PRN Nausea and/or Vomiting      Allergies    No Known Allergies      Vital Signs Last 24 Hrs  T(C): 36.8 (23 Aug 2023 07:41), Max: 37 (22 Aug 2023 20:14)  T(F): 98.3 (23 Aug 2023 07:41), Max: 98.6 (22 Aug 2023 20:14)  HR: 98 (23 Aug 2023 07:41) (84 - 102)  BP: 155/66 (23 Aug 2023 07:41) (116/71 - 155/66)  BP(mean): --  RR: 18 (23 Aug 2023 07:41) (17 - 18)  SpO2: 98% (23 Aug 2023 07:41) (93% - 99%)    Parameters below as of 23 Aug 2023 07:41  Patient On (Oxygen Delivery Method): room air        PHYSICAL EXAM:    GENERAL: NAD  CHEST/LUNG: Clear to ausculation bilaterally  HEART: Regular rate and rhythm; S1 S2;   ABDOMEN: Soft, Nontender, ; Bowel sounds present  EXTREMITIES:  no edema  gu boo  NERVOUS SYSTEM:  Alert & Oriented X3,  Motor Strength 5/5 B/L upper and lower extremities  PSYCH: normal mood, appropriate response.    LABS:                        10.3   7.35  )-----------( 307      ( 22 Aug 2023 05:17 )             31.0     08-23    143  |  109<H>  |  23.9<H>  ----------------------------<  99  4.6   |  23.0  |  2.17<H>    Ca    8.8      23 Aug 2023 07:42    TPro  6.9  /  Alb  3.6  /  TBili  0.4  /  DBili  x   /  AST  24  /  ALT  13  /  AlkPhos  86  08-21      Urinalysis Basic - ( 23 Aug 2023 07:42 )    Color: x / Appearance: x / SG: x / pH: x  Gluc: 99 mg/dL / Ketone: x  / Bili: x / Urobili: x   Blood: x / Protein: x / Nitrite: x   Leuk Esterase: x / RBC: x / WBC x   Sq Epi: x / Non Sq Epi: x / Bacteria: x        CAPILLARY BLOOD GLUCOSE      POCT Blood Glucose.: 165 mg/dL (22 Aug 2023 14:48)  POCT Blood Glucose.: 299 mg/dL (22 Aug 2023 14:46)  POCT Blood Glucose.: 124 mg/dL (22 Aug 2023 11:49)        RADIOLOGY & ADDITIONAL TESTS:

## 2023-08-23 NOTE — PROGRESS NOTE ADULT - ASSESSMENT
69 y/o male with PMH of  TIA,  HTN came to the ED complaining of LE edema x 1 week with associated lower abdominal pain around his waist, decrease urination, dyspnea on exertion, orthopnea, chills and decrease appetite. In the ED, found to be in JUANJO, with hyperkalemia.     ARF; COSME     maintain boo, flomax     cr improving     urology consulted      hyperkalemia: resolved    LE edema, resolved  echo with new onset EF 20%    cardiology consulted    coreg/hydralazine    aspirin/statin    HTN/TIA   Patient has not taken medications in 8 months   dc Amlodipine 10mg   Will hold Lisinopril due to ARF   Atorvastatin 80mg   asa       Nicotine dependence   Nicotine patch   Cessation advised         cont tele     Plan of care discussed with patient

## 2023-08-23 NOTE — PROGRESS NOTE ADULT - ASSESSMENT
69 y/o male with PMH of  TIA,  HTN came to the ED complaining of gradual progressive LE edema. Labs significant for JUANJO, hyperK and metabolic acidosis. CT w/ mod B/l HDN.    1. Acute kidney injury on CKD, NOS:  -JUANJO likely from obstructive uropathy     -Baseline SCr: unclear, SCr on admission 11.18 and today 2.17 mg/dl  -UA: pending  -Imaging: Moderate bilateral hydroureteronephrosis to the level of the mid/distal ureters without obstructing stone identified. Bilateral perinephric stranding. Distended bladder with mild wall thickening and numerous diverticula. Rec correlation with urinalysis. Bateman catheter misplaced with balloon in the penile urethra- rec repositioning.    -Bateman repositioned and patient is making great UOP  -Added Flomax  -Expecting Scr to continue improve    2.Hyperkalemia:  -Improved    3. Metabolic acidosis:  -Improved    4. Anemia: iron deficiency, add ferrous sulfate.     5. Volume overload: will improve w/ improvement in JUANJO.

## 2023-08-23 NOTE — CONSULT NOTE ADULT - SUBJECTIVE AND OBJECTIVE BOX
HPI: 67 y/o male with PMH of  TIA,  HTN came to the ED complaining of LE edema x 1 week. Patient first noticed swelling of the LLE but later both extremities extending to his abdomen. Reported lower abdominal pain around his waist, decrease urination, dyspnea on exertion, orthopnea, chills and decrease appetite in the last 4 days. Of note, patient said he has not taken his medications in about 8 months because he ran out and could not secure an appointment with his PCP. He has no chest pain, nausea, vomiting, palpitation, fever, recent travel, calf pain, HA.  (21 Aug 2023 22:50)    Patient was seen and examined. No acute complaints and resting comfortably in bed. Bateman in place draining clear/yellow urine. Patient states he has had decreased urinary stream over the last few weeks until a few days ago he was unable to void at all standing up. Able to have a few dribbles while sitting. No history of enlarged prostate. CT scan performed does not show any obstructive pathology. Renal failure improving along with hyperkalemia.        MEDICATIONS  (STANDING):  aspirin enteric coated 81 milliGRAM(s) Oral daily  atorvastatin 80 milliGRAM(s) Oral at bedtime  carvedilol 6.25 milliGRAM(s) Oral every 12 hours  heparin   Injectable 5000 Unit(s) SubCutaneous every 8 hours  hydrALAZINE 25 milliGRAM(s) Oral three times a day  nicotine - 21 mG/24Hr(s) Patch 1 Patch Transdermal daily  tamsulosin 0.4 milliGRAM(s) Oral at bedtime    MEDICATIONS  (PRN):  acetaminophen     Tablet .. 650 milliGRAM(s) Oral every 6 hours PRN Temp greater or equal to 38C (100.4F), Mild Pain (1 - 3)  aluminum hydroxide/magnesium hydroxide/simethicone Suspension 30 milliLiter(s) Oral every 4 hours PRN Dyspepsia  melatonin 3 milliGRAM(s) Oral at bedtime PRN Insomnia  ondansetron Injectable 4 milliGRAM(s) IV Push every 8 hours PRN Nausea and/or Vomiting      Vital Signs Last 24 Hrs  T(C): 37 (23 Aug 2023 12:09), Max: 37 (22 Aug 2023 20:14)  T(F): 98.6 (23 Aug 2023 12:09), Max: 98.6 (22 Aug 2023 20:14)  HR: 97 (23 Aug 2023 13:32) (84 - 102)  BP: 123/67 (23 Aug 2023 13:32) (116/71 - 155/66)  BP(mean): --  RR: 18 (23 Aug 2023 12:09) (17 - 18)  SpO2: 96% (23 Aug 2023 12:09) (93% - 99%)    Parameters below as of 23 Aug 2023 12:09  Patient On (Oxygen Delivery Method): room air      08-22 - 08-23  --------------------------------------------------------  IN:  Total IN: 0 mL    OUT:    Voided (mL): 1300 mL  Total OUT: 1300 mL    Total NET: -1300 mL          Physical Exam:    Constitutional: NAD  HEENT: PERRL, EOMI  Neck: No JVD, FROM without pain  Respiratory: Respirations non-labored, no accessory muscle use  Cardiovascular: Regular rate & rhythm  Gastrointestinal: Soft, non-tender, non-distended  : Bateman in place draining clear/yellow urine      LABS:                        10.3   7.35  )-----------( 307      ( 22 Aug 2023 05:17 )             31.0     08-23    143  |  109<H>  |  23.9<H>  ----------------------------<  99  4.6   |  23.0  |  2.17<H>    Ca    8.8      23 Aug 2023 07:42    TPro  6.9  /  Alb  3.6  /  TBili  0.4  /  DBili  x   /  AST  24  /  ALT  13  /  AlkPhos  86  08-21      Urinalysis Basic - ( 23 Aug 2023 07:42 )    Color: x / Appearance: x / SG: x / pH: x  Gluc: 99 mg/dL / Ketone: x  / Bili: x / Urobili: x   Blood: x / Protein: x / Nitrite: x   Leuk Esterase: x / RBC: x / WBC x   Sq Epi: x / Non Sq Epi: x / Bacteria: x

## 2023-08-24 ENCOUNTER — TRANSCRIPTION ENCOUNTER (OUTPATIENT)
Age: 68
End: 2023-08-24

## 2023-08-24 LAB
ANION GAP SERPL CALC-SCNC: 13 MMOL/L — SIGNIFICANT CHANGE UP (ref 5–17)
BUN SERPL-MCNC: 15.8 MG/DL — SIGNIFICANT CHANGE UP (ref 8–20)
CALCIUM SERPL-MCNC: 8.4 MG/DL — SIGNIFICANT CHANGE UP (ref 8.4–10.5)
CHLORIDE SERPL-SCNC: 106 MMOL/L — SIGNIFICANT CHANGE UP (ref 96–108)
CO2 SERPL-SCNC: 20 MMOL/L — LOW (ref 22–29)
CREAT SERPL-MCNC: 1.21 MG/DL — SIGNIFICANT CHANGE UP (ref 0.5–1.3)
EGFR: 66 ML/MIN/1.73M2 — SIGNIFICANT CHANGE UP
GLUCOSE SERPL-MCNC: 97 MG/DL — SIGNIFICANT CHANGE UP (ref 70–99)
HCT VFR BLD CALC: 30.9 % — LOW (ref 39–50)
HGB BLD-MCNC: 10.4 G/DL — LOW (ref 13–17)
MCHC RBC-ENTMCNC: 30.9 PG — SIGNIFICANT CHANGE UP (ref 27–34)
MCHC RBC-ENTMCNC: 33.7 GM/DL — SIGNIFICANT CHANGE UP (ref 32–36)
MCV RBC AUTO: 91.7 FL — SIGNIFICANT CHANGE UP (ref 80–100)
PLATELET # BLD AUTO: 306 K/UL — SIGNIFICANT CHANGE UP (ref 150–400)
POTASSIUM SERPL-MCNC: 4.1 MMOL/L — SIGNIFICANT CHANGE UP (ref 3.5–5.3)
POTASSIUM SERPL-SCNC: 4.1 MMOL/L — SIGNIFICANT CHANGE UP (ref 3.5–5.3)
RBC # BLD: 3.37 M/UL — LOW (ref 4.2–5.8)
RBC # FLD: 13.2 % — SIGNIFICANT CHANGE UP (ref 10.3–14.5)
SODIUM SERPL-SCNC: 139 MMOL/L — SIGNIFICANT CHANGE UP (ref 135–145)
TSH SERPL-MCNC: 1.21 UIU/ML — SIGNIFICANT CHANGE UP (ref 0.27–4.2)
WBC # BLD: 6.63 K/UL — SIGNIFICANT CHANGE UP (ref 3.8–10.5)
WBC # FLD AUTO: 6.63 K/UL — SIGNIFICANT CHANGE UP (ref 3.8–10.5)

## 2023-08-24 PROCEDURE — 99232 SBSQ HOSP IP/OBS MODERATE 35: CPT

## 2023-08-24 RX ORDER — ACETAMINOPHEN 500 MG
650 TABLET ORAL EVERY 6 HOURS
Refills: 0 | Status: DISCONTINUED | OUTPATIENT
Start: 2023-08-24 | End: 2023-08-25

## 2023-08-24 RX ORDER — LIDOCAINE HCL 20 MG/ML
5 VIAL (ML) INJECTION ONCE
Refills: 0 | Status: COMPLETED | OUTPATIENT
Start: 2023-08-24 | End: 2023-08-24

## 2023-08-24 RX ORDER — HEPARIN SODIUM 5000 [USP'U]/ML
5000 INJECTION INTRAVENOUS; SUBCUTANEOUS EVERY 8 HOURS
Refills: 0 | Status: DISCONTINUED | OUTPATIENT
Start: 2023-08-25 | End: 2023-08-25

## 2023-08-24 RX ADMIN — HEPARIN SODIUM 5000 UNIT(S): 5000 INJECTION INTRAVENOUS; SUBCUTANEOUS at 05:19

## 2023-08-24 RX ADMIN — Medication 5 MILLILITER(S): at 15:00

## 2023-08-24 RX ADMIN — HEPARIN SODIUM 5000 UNIT(S): 5000 INJECTION INTRAVENOUS; SUBCUTANEOUS at 14:58

## 2023-08-24 RX ADMIN — Medication 1 PATCH: at 11:57

## 2023-08-24 RX ADMIN — TAMSULOSIN HYDROCHLORIDE 0.4 MILLIGRAM(S): 0.4 CAPSULE ORAL at 22:43

## 2023-08-24 RX ADMIN — Medication 1 PATCH: at 08:01

## 2023-08-24 RX ADMIN — Medication 25 MILLIGRAM(S): at 22:43

## 2023-08-24 RX ADMIN — CARVEDILOL PHOSPHATE 6.25 MILLIGRAM(S): 80 CAPSULE, EXTENDED RELEASE ORAL at 05:19

## 2023-08-24 RX ADMIN — Medication 25 MILLIGRAM(S): at 14:58

## 2023-08-24 RX ADMIN — Medication 650 MILLIGRAM(S): at 09:02

## 2023-08-24 RX ADMIN — ATORVASTATIN CALCIUM 80 MILLIGRAM(S): 80 TABLET, FILM COATED ORAL at 22:44

## 2023-08-24 RX ADMIN — Medication 81 MILLIGRAM(S): at 11:57

## 2023-08-24 RX ADMIN — Medication 25 MILLIGRAM(S): at 05:19

## 2023-08-24 RX ADMIN — Medication 1 PATCH: at 12:00

## 2023-08-24 RX ADMIN — CARVEDILOL PHOSPHATE 6.25 MILLIGRAM(S): 80 CAPSULE, EXTENDED RELEASE ORAL at 19:13

## 2023-08-24 RX ADMIN — Medication 650 MILLIGRAM(S): at 09:30

## 2023-08-24 NOTE — DISCHARGE NOTE PROVIDER - CARE PROVIDER_API CALL
Rodney Liz  Urology  [address] [place] [address]  Phone: [phone]  Fax: [phone]  Follow Up Time: 1 week    [place] [address]  Phone: [phone]  Fax: [phone]  Follow Up Time: 1 week    Sheela Casper  [address] [place] [address]  Phone: [phone]  Fax: (   )    -  Scheduled Appointment: 08/29/2023 09:15 AM

## 2023-08-24 NOTE — DISCHARGE NOTE PROVIDER - CARE PROVIDERS DIRECT ADDRESSES
[email or web address]

## 2023-08-24 NOTE — DISCHARGE NOTE PROVIDER - HOSPITAL COURSE
69 y/o male with PMH of  TIA,  HTN came to the ED complaining of LE edema x 1 week with associated lower abdominal pain around his waist, decrease urination, dyspnea on exertion, orthopnea, chills and decrease appetite. In the ED, found to be in JUANJO, with hyperkalemia.     ARF; COSME     maintain boo, flomax     cr improving     urology follow up noted, maintain boo on dc, outpatient f/u for w/u       hyperkalemia: resolved    Acute Systolic Heart Failure)  echo with new onset EF 20%     coreg/losartan     aspirin/statin    cath with mild CAD    LifeVest prior to discharge    Cardio follow up noted     HTN/TIA   Patient has not taken medications in 8 months   Aspirin, Lipitor, Losartan and Coreg     Nicotine dependence   Nicotine patch   Cessation advised     Medically stable for discharge with close outpatient follow ups.

## 2023-08-24 NOTE — PROGRESS NOTE ADULT - ASSESSMENT
69 y/o male with PMH of  TIA,  HTN came to the ED complaining of LE edema x 1 week with associated lower abdominal pain around his waist, decrease urination, dyspnea on exertion, orthopnea, chills and decrease appetite. In the ED, found to be in JUANJO, with hyperkalemia.     ARF; COSME     maintain boo, flomax     cr improving     urology following, maintain boo on dc, outpatient f/u for w/u       hyperkalemia: resolved    LE edema, resolved  echo with new onset EF 20%    cardiology following     coreg/hydralazine    aspirin/statin    cath today    HTN/TIA   Patient has not taken medications in 8 months   dc Amlodipine 10mg   Will hold Lisinopril due to ARF   Atorvastatin 80mg   asa       Nicotine dependence   Nicotine patch   Cessation advised         d/w patient  dc pending cath report

## 2023-08-24 NOTE — DISCHARGE NOTE PROVIDER - PROVIDER TOKENS
PROVIDER:[TOKEN:[[id number]],FOLLOWUP:[1 week]],PROVIDER:[TOKEN:[[id number]],FOLLOWUP:[1 week]],FREE:[LAST:[Trem[place]ne],FIRST:[Sheela],PHONE:[phone]],FAX:[(   [phone]    -],ADDRESS:[[address] [place] [address]],SCHEDULEDAPPT:[08/29/2023],SCHEDULEDAPPTTIME:[09:15 AM]]

## 2023-08-24 NOTE — PROGRESS NOTE ADULT - SUBJECTIVE AND OBJECTIVE BOX
[facility], [facility]                              [address] [place] [address]                                                 PHONE: [phone]                                                 FAX: [phone]  -----------------------------------------------------------------------------------------------  Pt seen and examined. FU for  shortness of breath    Overnight events/Complaints: Pt without complains.    Vital Signs Last 24 Hrs  T(C): 37 (24 Aug 2023 07:46), Max: 37.1 (23 Aug 2023 15:59)  T(F): 98.6 (24 Aug 2023 07:46), Max: 98.7 (23 Aug 2023 15:59)  HR: 79 (24 Aug 2023 07:46) (79 - 97)  BP: 117/71 (24 Aug 2023 07:46) (117/71 - 163/98)  BP(mean): --  RR: 18 (24 Aug 2023 07:46) (18 - 18)  SpO2: 95% (24 Aug 2023 07:46) (94% - 99%)    Parameters below as of 24 Aug 2023 07:46  Patient On (Oxygen Delivery Method): room air      I&O's Summary    23 Aug 2023 07:01  -  24 Aug 2023 07:00  --------------------------------------------------------  IN: 0 mL / OUT: 3850 mL / NET: -3850 mL        RELEVENT PHYSICAL EXAM:  Neck: No obvious JVD  Cardiovascular: regular S1, S2  Respiratory: Lungs clear to auscultation; no crepitations, no wheeze  Musculoskeletal: No edema        LABS:                        10.4   6.63  )-----------( 306      ( 24 Aug 2023 06:08 )             30.9     08-24    139  |  106  |  15.8  ----------------------------<  97  4.1   |  20.0<L>  |  1.21    Ca    8.4      24 Aug 2023 06:08    RADIOLOGY & ADDITIONAL STUDIES: (reviewed)  CXR was independently visualized/reviewed  and demonstrated: clear lungs    CARDIOLOGY TESTING:(reviewed)     12 lead EKG independently visualized/reviewed  and demonstrated ST    ECHOCARDIOGRAM independently visualized/reviewed and demonstrated :   Summary:   1. Left ventricular ejection fraction, by visual estimation, is 20 to   25%.   2. Severely decreased global left ventricular systolic function.   3. Spectral Doppler shows pseudonormal pattern of left ventricular   myocardial filling (Grade II diastolic dysfunction).   4. Mildy enlarged right ventricle with mildly reduced RV systolic   function.   5. Estimated pulmonary artery systolic pressure is 49.0 mmHg assuming a   right atrial pressure of 8 mmHg, which is consistent with mild pulmonary   hypertension.   6. Severely enlarged left atrium.   7. Moderate mitral valve regurgitation.   8. The mitral valveleaflets are tethered which is due to reduced   systolic function and elevated LVDP.   9. Mild-moderate tricuspid regurgitation.  10. Mild pulmonic valve regurgitation.  11. Trivial pericardial effusion.  12. Compared to the prior TTE study from 10/5/17, biventricular systolic   dysfunction present.    MEDICATIONS:(reviewed)  MEDICATIONS  (STANDING):  aspirin enteric coated 81 milliGRAM(s) Oral daily  atorvastatin 80 milliGRAM(s) Oral at bedtime  carvedilol 6.25 milliGRAM(s) Oral every 12 hours  heparin   Injectable 5000 Unit(s) SubCutaneous every 8 hours  hydrALAZINE 25 milliGRAM(s) Oral three times a day  lidocaine 2% Jelly 5 milliLiter(s) IntraUrethral once  nicotine - 21 mG/24Hr(s) Patch 1 Patch Transdermal daily  tamsulosin 0.4 milliGRAM(s) Oral at bedtime    ASSESSMENT AND PLAN:    68y Male with past medical history significant for HTN HDL CVA who presents to [facility] dyspnea orthopnea weight gain and complaining of LE edema x 1 week.   Patient said he has not taken his medications in about 8 months because he ran out and could not secure an appointment with his PCP. He has no chest pain, nausea, vomiting, palpitation, fever, recent travel, calf pain, HA.   Patient found to have JUANJO likely due to obstructive  uropathy improving.    TTE EF 20 to 25% DDD II mild PHTN LA severly enlarged moderate MR compared to prior TTE 2017 new biventricular systolic failure       Coreg 6.25 mg po BID   Hydralazine 25 mg po TID   ASA and statin therapy   Right and Left heart cath today as renal function improved    Plan discussed with Dr. Urrutia

## 2023-08-24 NOTE — PROGRESS NOTE ADULT - SUBJECTIVE AND OBJECTIVE BOX
Interventional Cardiology NP pre/post procedure note:  Interventional Cardiology NP pre/post procedure note:     -For LHC/RHC with Dr. Lott      EKG: < from: 12 Lead ECG (08.21.23 @ 21:08) >  Ventricular Rate 110 BPM    Atrial Rate 110 BPM    P-R Interval 152 ms    QRS Duration 88 ms    Q-T Interval 364 ms    QTC Calculation(Bazett) 492 ms    P Axis 69 degrees    R Axis 41 degrees    T Axis 73 degrees    Diagnosis Line *** Poor data quality, interpretation may be adversely affected  Sinus tachycardia  Nonspecific T wave abnormality  Abnormal ECG    Confirmed by Po Sahni ([id number]) on 8/22/2023 2:04:53 PM    < end of copied text >    TELE: NSR    MEDICATIONS  (STANDING):  aspirin enteric coated 81 milliGRAM(s) Oral daily  atorvastatin 80 milliGRAM(s) Oral at bedtime  carvedilol 6.25 milliGRAM(s) Oral every 12 hours  heparin   Injectable 5000 Unit(s) SubCutaneous every 8 hours  hydrALAZINE 25 milliGRAM(s) Oral three times a day  nicotine - 21 mG/24Hr(s) Patch 1 Patch Transdermal daily  tamsulosin 0.4 milliGRAM(s) Oral at bedtime    MEDICATIONS  (PRN):  acetaminophen     Tablet .. 650 milliGRAM(s) Oral every 6 hours PRN Temp greater or equal to 38C (100.4F), Moderate Pain (4 - 6), Severe Pain (7 - 10)  aluminum hydroxide/magnesium hydroxide/simethicone Suspension 30 milliLiter(s) Oral every 4 hours PRN Dyspepsia  melatonin 3 milliGRAM(s) Oral at bedtime PRN Insomnia  ondansetron Injectable 4 milliGRAM(s) IV Push every 8 hours PRN Nausea and/or Vomiting      Allergies:  No Known Allergies      PAST MEDICAL & SURGICAL HISTORY:  HTN (hypertension)      H/O left inguinal hernia repair      H/O knee surgery          Vital Signs Last 24 Hrs  T(C): 36.8 (24 Aug 2023 17:10), Max: 37 (24 Aug 2023 07:46)  T(F): 98.3 (24 Aug 2023 17:10), Max: 98.6 (24 Aug 2023 07:46)  HR: 79 (24 Aug 2023 17:10) (76 - 92)  BP: 135/68 (24 Aug 2023 17:10) (102/61 - 163/98)  BP(mean): --  RR: 12 (24 Aug 2023 17:10) (12 - 18)  SpO2: 100% (24 Aug 2023 17:10) (94% - 100%)    Parameters below as of 24 Aug 2023 17:10  Patient On (Oxygen Delivery Method): room air        Physical Exam:  Constitutional: NAD, AAOx3  Cardiovascular: +S1S2 RRR  Pulmonary: CTA b/l, unlabored  GI: soft NTND +BS  Extremities: no pedal edema, +distal pulses b/l  Neuro: non focal, MERLOS x4  ASA 2  Mallampati 2  GFR 66  creat 1.21  BRA 2.4%    LABS:                        10.4   6.63  )-----------( 306      ( 24 Aug 2023 06:08 )             30.9     08-24    139  |  106  |  15.8  ----------------------------<  97  4.1   |  20.0<L>  |  1.21    Ca    8.4      24 Aug 2023 06:08        Urinalysis Basic - ( 24 Aug 2023 06:08 )    Color: x / Appearance: x / SG: x / pH: x  Gluc: 97 mg/dL / Ketone: x  / Bili: x / Urobili: x   Blood: x / Protein: x / Nitrite: x   Leuk Esterase: x / RBC: x / WBC x   Sq Epi: x / Non Sq Epi: x / Bacteria: x        RADIOLOGY & ADDITIONAL TESTS:  < from: TTE Echo Complete w/ Contrast w/ Doppler (08.22.23 @ 18:36) >  PHYSICIAN INTERPRETATION:  Left Ventricle: The left ventricular internal cavity size is normal.  Global LV systolic function was severely decreased. Left ventricular   ejection fraction, by visual estimation, is 20 to 25%. Spectral Doppler   shows pseudonormal pattern of left ventricular myocardial filling (Grade   II diastolic dysfunction).  Right Ventricle: The right ventricular size is mildly enlarged.  Left Atrium: Severely enlarged left atrium.  Right Atrium: Normal right atrial size.  Pericardium: Trivial pericardial effusion is present. The pericardial   effusion is lateral to the left ventricle.  Mitral Valve: The mitral valve leaflets are tethered which is due to   reduced systolic function and elevated LVDP. Moderate mitral valve   regurgitation is seen.  Tricuspid Valve: The tricuspid valve is normal in structure.   Mild-moderate tricuspid regurgitation is visualized. Estimated pulmonary   artery systolic pressure is 49.0 mmHg assuming a right atrial pressure of   8 mmHg, which is consistent with mild pulmonary hypertension.  Aortic Valve: The aortic valve is trileaflet. No evidence of aortic   stenosis. Trivial aortic valve regurgitation is seen.  Pulmonic Valve: The pulmonic valve was not well visualized. Mild pulmonic   valve regurgitation.  Aorta: The aortic root is normal in size and structure.  Pulmonary Artery: The pulmonary artery is of normal size and origin.  Venous: The pulmonary veins were not well visualized. The inferior vena   cava is 2.3 cm. The inferior vena cava was dilated, with respiratory size   variation greater than 50%.  In comparison to the previous echocardiogram(s): Prior examinations are   available and were reviewed for comparison purposes. Compared to the   prior TTE study from 10/5/17, biventricular systolic dysfunction present.      Summary:   1. Left ventricular ejection fraction, by visual estimation, is 20 to   25%.   2. Severely decreased global left ventricular systolic function.   3. Spectral Doppler shows pseudonormal pattern of left ventricular   myocardial filling (Grade II diastolic dysfunction).   4. Mildy enlarged right ventricle with mildly reduced RV systolic   function.   5. Estimated pulmonary artery systolic pressure is 49.0 mmHg assuming a   right atrial pressure of 8 mmHg, which is consistent with mild pulmonary   hypertension.   6. Severely enlarged left atrium.   7. Moderate mitral valve regurgitation.   8. The mitral valveleaflets are tethered which is due to reduced   systolic function and elevated LVDP.   9. Mild-moderate tricuspid regurgitation.  10. Mild pulmonic valve regurgitation.  11. Trivial pericardial effusion.  12. Compared to the prior TTE study from 10/5/17, biventricular systolic   dysfunction present.    Mayur Hinojosa DO Electronically signed on 8/23/2023 at 8:40:35 AM      < end of copied text >

## 2023-08-24 NOTE — DISCHARGE NOTE PROVIDER - NSDCCPCAREPLAN_GEN_ALL_CORE_FT
PRINCIPAL DISCHARGE DIAGNOSIS  Diagnosis: Acute renal failure  Assessment and Plan of Treatment: Likely postobstructive   s/p Bateman's catheter   Continue medications as prescribed.  Follow up with Uro in 1 week.      SECONDARY DISCHARGE DIAGNOSES  Diagnosis: Acute systolic HF (heart failure)  Assessment and Plan of Treatment: Continue medications as prescribed.  Follow up with Cardio in 1 week.

## 2023-08-24 NOTE — DISCHARGE NOTE PROVIDER - NSDCMRMEDTOKEN_GEN_ALL_CORE_FT
amLODIPine 5 mg oral tablet: 1 tab(s) orally once a day MDD:1  atorvastatin 80 mg oral tablet: 1 tab(s) orally once a day (at bedtime) MDD:1   What Type Of Note Output Would You Prefer (Optional)?: Standard Output Is This A New Presentation, Or A Follow-Up?: Rash aspirin 81 mg oral delayed release tablet: 1 tab(s) orally once a day  atorvastatin 80 mg oral tablet: 1 tab(s) orally once a day (at bedtime) MDD: 1  carvedilol 6.25 mg oral tablet: 1 tab(s) orally every 12 hours  losartan 50 mg oral tablet: 1 tab(s) orally once a day  nicotine 21 mg/24 hr transdermal film, extended release: 1 patch transdermal once a day  tamsulosin 0.4 mg oral capsule: 1 cap(s) orally once a day (at bedtime)

## 2023-08-24 NOTE — PROGRESS NOTE ADULT - ASSESSMENT
A/P: 68y Male with past medical history significant for HTN HDL CVA who presents to [facility] dyspnea orthopnea weight gain and complaining of LE edema x 1 week.  Patient said he has not taken his medications in about 8 months because he ran out and could not secure an appointment with his PCP. He has no chest pain, nausea, vomiting, palpitation, fever, recent travel, calf pain, HA.  Patient found to have JUANJO likely due to obstructive  uropathy improving.  TTE EF 20 to 25% DDD II mild PHTN LA severly enlarged moderate MR compared to prior TTE 2017 new biventricular systolic failure.  Presents for LHC/RHC with Dr. Lott for further evaluation.    NPO for procedure  Consent obtained by MD  IVF hydration preprocedure to prevent LUCIO held secondary to EF 20-25%  ASA taken this am   A/P: 68y Male with past medical history significant for HTN HDL CVA who presents to [facility] dyspnea orthopnea weight gain and complaining of LE edema x 1 week.  Patient said he has not taken his medications in about 8 months because he ran out and could not secure an appointment with his PCP. He has no chest pain, nausea, vomiting, palpitation, fever, recent travel, calf pain, HA.  Patient found to have JUANJO likely due to obstructive  uropathy improving.  TTE EF 20 to 25% DDD II mild PHTN LA severly enlarged moderate MR compared to prior TTE 2017 new biventricular systolic failure.  Presents for LHC/RHC with Dr. Lott for further evaluation.    NPO for procedure  Consent obtained by MD  IVF hydration preprocedure to prevent LUCIO held secondary to EF 20-25%  ASA taken this am    INTERVENTIONAL CARDIOLOGY NP ADDENDUM:  -s/p LHC/RHC via RFA/RFV with Dr. Lott: mild CAD; elevated filling pressures (prelim report; official report to follow)  -RFA Mynx/RFV site benign without hematoma/bleeding; no bruit; + right PP  -Bedrest with HOB < 30 degrees x 2 hours post removal of RFV sheath then OOB  -IVF hydration post procedure to prevent LUCIO held secondary to elevated filling pressures  -Meds: Maintain Coreg (patient not good candidate for entresto secondary to noncompliance); ARB as per nephrology secondary to JUANJO on admission  -Dr. Lott recommending Life Vest--and called and left message for representative  -Hold Heparin SQ tonight and restart in am if cath site benign  -Additional plan as per Attending MD and [facility]  -Discussed with Dr. Lott

## 2023-08-24 NOTE — DISCHARGE NOTE PROVIDER - ATTENDING DISCHARGE PHYSICAL EXAMINATION:
Vital Signs   T(C): 36.9 (25 Aug 2023 12:47), Max: 36.9 (25 Aug 2023 12:47)  T(F): 98.4 (25 Aug 2023 12:47), Max: 98.4 (25 Aug 2023 12:47)  HR: 89 (25 Aug 2023 12:47) (66 - 89)  BP: 113/67 (25 Aug 2023 12:47) (105/55 - 159/87)  RR: 16 (25 Aug 2023 12:47) (12 - 18)  SpO2: 96% (25 Aug 2023 12:47) (91% - 100%)  Parameters below as of 25 Aug 2023 12:47  Patient On (Oxygen Delivery Method): room air  General: Elderly male sitting in bed comfortably. No acute distress  HEENT: EOMI. Clear conjunctivae. Moist mucus membrane  Neck: Supple.   Chest: CTA bilaterally - no wheezing, rales or rhonchi.   Heart: Normal S1 & S2 with RRR.   Abdomen: Non distended. Soft. Non-tender. + BS  : Bateman's catheter in place   Ext: No pedal edema. No calf tenderness   Neuro: AAO x 3. No focal deficit. No speech disorder.  Skin: Warm and Dry  Psychiatry: Normal mood and affect

## 2023-08-24 NOTE — DISCHARGE NOTE PROVIDER - NSDCCPTREATMENT_GEN_ALL_CORE_FT
PRINCIPAL PROCEDURE  Procedure: Left and right heart catheterization  Findings and Treatment: No heavy lifting, driving, sex, tub baths, swimming, or any activity that submerges the lower half of the body in water for 48 hours.  Limited walking and stairs for 48 hours.    Change the bandaid after 24 hours and every 24 hours after that.  Keep the puncture site dry and covered with a bandaid until a scab forms.    Observe the site frequently.  If bleeding or a large lump (the size of a golf ball or bigger) occurs lie flat, apply continuous direct pressure just above the puncture site for at least 10 minutes, and notify your physician immediately.  If the bleeding cannot be controlled, call 911 immediately for assistance.  Notify your physician of pain, swelling or any drainage.    Notify your physician immediately if coldness, numbness, discoloration or pain in your foot occurs.

## 2023-08-24 NOTE — PROGRESS NOTE ADULT - SUBJECTIVE AND OBJECTIVE BOX
seen for chf, arf    no acute complaints  feels well  ros negative     MEDICATIONS  (STANDING):  aspirin enteric coated 81 milliGRAM(s) Oral daily  atorvastatin 80 milliGRAM(s) Oral at bedtime  carvedilol 6.25 milliGRAM(s) Oral every 12 hours  heparin   Injectable 5000 Unit(s) SubCutaneous every 8 hours  hydrALAZINE 25 milliGRAM(s) Oral three times a day  lidocaine 2% Jelly 5 milliLiter(s) IntraUrethral once  nicotine - 21 mG/24Hr(s) Patch 1 Patch Transdermal daily  tamsulosin 0.4 milliGRAM(s) Oral at bedtime    MEDICATIONS  (PRN):  acetaminophen     Tablet .. 650 milliGRAM(s) Oral every 6 hours PRN Temp greater or equal to 38C (100.4F), Moderate Pain (4 - 6), Severe Pain (7 - 10)  aluminum hydroxide/magnesium hydroxide/simethicone Suspension 30 milliLiter(s) Oral every 4 hours PRN Dyspepsia  melatonin 3 milliGRAM(s) Oral at bedtime PRN Insomnia  ondansetron Injectable 4 milliGRAM(s) IV Push every 8 hours PRN Nausea and/or Vomiting      Allergies    No Known Allergies        Vital Signs Last 24 Hrs  T(C): 37 (24 Aug 2023 07:46), Max: 37.1 (23 Aug 2023 15:59)  T(F): 98.6 (24 Aug 2023 07:46), Max: 98.7 (23 Aug 2023 15:59)  HR: 76 (24 Aug 2023 08:00) (76 - 97)  BP: 117/71 (24 Aug 2023 07:46) (117/71 - 163/98)  BP(mean): --  RR: 18 (24 Aug 2023 07:46) (18 - 18)  SpO2: 95% (24 Aug 2023 07:46) (94% - 99%)    Parameters below as of 24 Aug 2023 07:46  Patient On (Oxygen Delivery Method): room air        PHYSICAL EXAM:    GENERAL: NAD  CHEST/LUNG: Clear to ausculation bilaterally;  HEART: Regular rate and rhythm; S1 S2  ABDOMEN: Soft, Nontender, Bowel sounds present  EXTREMITIES: no edema  gu boo clear urine   NERVOUS SYSTEM:  Alert & Oriented X3, Motor Strength 5/5 B/L upper and lower extremities  PSYCH: normal mood, appropriate response.    LABS:                        10.4   6.63  )-----------( 306      ( 24 Aug 2023 06:08 )             30.9     08-24    139  |  106  |  15.8  ----------------------------<  97  4.1   |  20.0<L>  |  1.21    Ca    8.4      24 Aug 2023 06:08        Urinalysis Basic - ( 24 Aug 2023 06:08 )    Color: x / Appearance: x / SG: x / pH: x  Gluc: 97 mg/dL / Ketone: x  / Bili: x / Urobili: x   Blood: x / Protein: x / Nitrite: x   Leuk Esterase: x / RBC: x / WBC x   Sq Epi: x / Non Sq Epi: x / Bacteria: x        CAPILLARY BLOOD GLUCOSE            RADIOLOGY & ADDITIONAL TESTS:

## 2023-08-25 ENCOUNTER — TRANSCRIPTION ENCOUNTER (OUTPATIENT)
Age: 68
End: 2023-08-25

## 2023-08-25 VITALS — HEART RATE: 88 BPM | SYSTOLIC BLOOD PRESSURE: 122 MMHG | DIASTOLIC BLOOD PRESSURE: 67 MMHG

## 2023-08-25 LAB
ANION GAP SERPL CALC-SCNC: 13 MMOL/L — SIGNIFICANT CHANGE UP (ref 5–17)
BUN SERPL-MCNC: 16.3 MG/DL — SIGNIFICANT CHANGE UP (ref 8–20)
CALCIUM SERPL-MCNC: 8.7 MG/DL — SIGNIFICANT CHANGE UP (ref 8.4–10.5)
CHLORIDE SERPL-SCNC: 106 MMOL/L — SIGNIFICANT CHANGE UP (ref 96–108)
CO2 SERPL-SCNC: 21 MMOL/L — LOW (ref 22–29)
CREAT SERPL-MCNC: 0.98 MG/DL — SIGNIFICANT CHANGE UP (ref 0.5–1.3)
EGFR: 85 ML/MIN/1.73M2 — SIGNIFICANT CHANGE UP
GLUCOSE SERPL-MCNC: 96 MG/DL — SIGNIFICANT CHANGE UP (ref 70–99)
MAGNESIUM SERPL-MCNC: 1.6 MG/DL — SIGNIFICANT CHANGE UP (ref 1.6–2.6)
POTASSIUM SERPL-MCNC: 4 MMOL/L — SIGNIFICANT CHANGE UP (ref 3.5–5.3)
POTASSIUM SERPL-SCNC: 4 MMOL/L — SIGNIFICANT CHANGE UP (ref 3.5–5.3)
SODIUM SERPL-SCNC: 140 MMOL/L — SIGNIFICANT CHANGE UP (ref 135–145)

## 2023-08-25 PROCEDURE — 82803 BLOOD GASES ANY COMBINATION: CPT

## 2023-08-25 PROCEDURE — 99239 HOSP IP/OBS DSCHRG MGMT >30: CPT

## 2023-08-25 PROCEDURE — C8929: CPT

## 2023-08-25 PROCEDURE — 80053 COMPREHEN METABOLIC PANEL: CPT

## 2023-08-25 PROCEDURE — 94640 AIRWAY INHALATION TREATMENT: CPT

## 2023-08-25 PROCEDURE — 93005 ELECTROCARDIOGRAM TRACING: CPT

## 2023-08-25 PROCEDURE — 84132 ASSAY OF SERUM POTASSIUM: CPT

## 2023-08-25 PROCEDURE — 96374 THER/PROPH/DIAG INJ IV PUSH: CPT

## 2023-08-25 PROCEDURE — 80048 BASIC METABOLIC PNL TOTAL CA: CPT

## 2023-08-25 PROCEDURE — 82728 ASSAY OF FERRITIN: CPT

## 2023-08-25 PROCEDURE — 71045 X-RAY EXAM CHEST 1 VIEW: CPT

## 2023-08-25 PROCEDURE — 84156 ASSAY OF PROTEIN URINE: CPT

## 2023-08-25 PROCEDURE — C1887: CPT

## 2023-08-25 PROCEDURE — 82570 ASSAY OF URINE CREATININE: CPT

## 2023-08-25 PROCEDURE — 99285 EMERGENCY DEPT VISIT HI MDM: CPT | Mod: 25

## 2023-08-25 PROCEDURE — 85027 COMPLETE CBC AUTOMATED: CPT

## 2023-08-25 PROCEDURE — C1769: CPT

## 2023-08-25 PROCEDURE — 84443 ASSAY THYROID STIM HORMONE: CPT

## 2023-08-25 PROCEDURE — 93460 R&L HRT ART/VENTRICLE ANGIO: CPT

## 2023-08-25 PROCEDURE — 96375 TX/PRO/DX INJ NEW DRUG ADDON: CPT

## 2023-08-25 PROCEDURE — 74176 CT ABD & PELVIS W/O CONTRAST: CPT

## 2023-08-25 PROCEDURE — 83540 ASSAY OF IRON: CPT

## 2023-08-25 PROCEDURE — 36415 COLL VENOUS BLD VENIPUNCTURE: CPT

## 2023-08-25 PROCEDURE — 86803 HEPATITIS C AB TEST: CPT

## 2023-08-25 PROCEDURE — 83880 ASSAY OF NATRIURETIC PEPTIDE: CPT

## 2023-08-25 PROCEDURE — C1889: CPT

## 2023-08-25 PROCEDURE — 81001 URINALYSIS AUTO W/SCOPE: CPT

## 2023-08-25 PROCEDURE — 82962 GLUCOSE BLOOD TEST: CPT

## 2023-08-25 PROCEDURE — 84300 ASSAY OF URINE SODIUM: CPT

## 2023-08-25 PROCEDURE — 84466 ASSAY OF TRANSFERRIN: CPT

## 2023-08-25 PROCEDURE — C1894: CPT

## 2023-08-25 PROCEDURE — 76770 US EXAM ABDO BACK WALL COMP: CPT

## 2023-08-25 PROCEDURE — 83550 IRON BINDING TEST: CPT

## 2023-08-25 PROCEDURE — 84484 ASSAY OF TROPONIN QUANT: CPT

## 2023-08-25 PROCEDURE — 83735 ASSAY OF MAGNESIUM: CPT

## 2023-08-25 PROCEDURE — C1760: CPT

## 2023-08-25 PROCEDURE — 85025 COMPLETE CBC W/AUTO DIFF WBC: CPT

## 2023-08-25 RX ORDER — ASPIRIN/CALCIUM CARB/MAGNESIUM 324 MG
1 TABLET ORAL
Qty: 30 | Refills: 0
Start: 2023-08-25 | End: 2023-09-23

## 2023-08-25 RX ORDER — LOSARTAN POTASSIUM 100 MG/1
1 TABLET, FILM COATED ORAL
Qty: 30 | Refills: 0
Start: 2023-08-25 | End: 2023-09-23

## 2023-08-25 RX ORDER — ATORVASTATIN CALCIUM 80 MG/1
1 TABLET, FILM COATED ORAL
Qty: 30 | Refills: 0
Start: 2023-08-25 | End: 2023-09-23

## 2023-08-25 RX ORDER — NICOTINE POLACRILEX 2 MG
1 GUM BUCCAL
Qty: 2 | Refills: 0
Start: 2023-08-25 | End: 2023-09-21

## 2023-08-25 RX ORDER — LOSARTAN POTASSIUM 100 MG/1
50 TABLET, FILM COATED ORAL DAILY
Refills: 0 | Status: DISCONTINUED | OUTPATIENT
Start: 2023-08-25 | End: 2023-08-25

## 2023-08-25 RX ORDER — CARVEDILOL PHOSPHATE 80 MG/1
1 CAPSULE, EXTENDED RELEASE ORAL
Qty: 60 | Refills: 0
Start: 2023-08-25 | End: 2023-09-23

## 2023-08-25 RX ORDER — TAMSULOSIN HYDROCHLORIDE 0.4 MG/1
1 CAPSULE ORAL
Qty: 30 | Refills: 0
Start: 2023-08-25 | End: 2023-09-23

## 2023-08-25 RX ADMIN — Medication 25 MILLIGRAM(S): at 05:58

## 2023-08-25 RX ADMIN — Medication 1 PATCH: at 09:50

## 2023-08-25 RX ADMIN — Medication 81 MILLIGRAM(S): at 13:20

## 2023-08-25 RX ADMIN — Medication 1 PATCH: at 13:07

## 2023-08-25 RX ADMIN — CARVEDILOL PHOSPHATE 6.25 MILLIGRAM(S): 80 CAPSULE, EXTENDED RELEASE ORAL at 05:58

## 2023-08-25 RX ADMIN — HEPARIN SODIUM 5000 UNIT(S): 5000 INJECTION INTRAVENOUS; SUBCUTANEOUS at 13:20

## 2023-08-25 RX ADMIN — Medication 1 PATCH: at 13:19

## 2023-08-25 RX ADMIN — LOSARTAN POTASSIUM 50 MILLIGRAM(S): 100 TABLET, FILM COATED ORAL at 13:20

## 2023-08-25 RX ADMIN — HEPARIN SODIUM 5000 UNIT(S): 5000 INJECTION INTRAVENOUS; SUBCUTANEOUS at 05:58

## 2023-08-25 RX ADMIN — CARVEDILOL PHOSPHATE 6.25 MILLIGRAM(S): 80 CAPSULE, EXTENDED RELEASE ORAL at 17:19

## 2023-08-25 NOTE — PROGRESS NOTE ADULT - SUBJECTIVE AND OBJECTIVE BOX
Reason for visit: JUANJO    Subjective: No acute overnight event. Great UOP.     ROS: All systems were reviewed in detail pertinent positive and negative mentioned above, rest are negative.    PHYSICAL EXAM:  Gen: no acute distress  MS: alert, conversing normally  Eyes: EOMI, no icterus  HENT: NCAT, MMM  CV: rhythm reg reg, rate normal, no m/g/r, + LE edema  Chest: CTAB, no w/r/r,  Abd: soft, NT, ND  MSK: normal bulk and tone, no joint swelling  Skin: dry, warm, no rash or jaundice    LABS:  08-24    139  |  106  |  15.8  ----------------------------<  97  4.1   |  20.0<L>  |  1.21    Ca    8.4      24 Aug 2023 06:08      Urinalysis Basic - ( 22 Aug 2023 05:17 )  Color: x / Appearance: x / SG: x / pH: x  Gluc: 89 mg/dL / Ketone: x  / Bili: x / Urobili: x   Blood: x / Protein: x / Nitrite: x   Leuk Esterase: x / RBC: x / WBC x   Sq Epi: x / Non Sq Epi: x / Bacteria: x    RADIOLOGY & ADDITIONAL TESTS: Reviewed  CT abd/pelvis: Small bilateral pleural effusions. Mild edema at the lung bases. Moderate bilateral hydroureteronephrosis to the level of the mid/distal ureters without obstructing stone identified. Bilateral perinephric stranding. Distended bladder with mild wall thickening and numerous diverticula. Rec correlation with urinalysis. Bateman catheter misplaced with balloon in the penile urethra- rec repositioning.

## 2023-08-25 NOTE — PHYSICAL THERAPY INITIAL EVALUATION ADULT - TRANSFER SKILLS, REHAB EVAL
The chest was prepped. The site was prepped with ChloraPrep. The patient was draped. The patient was positioned supine.  independent

## 2023-08-25 NOTE — PROGRESS NOTE ADULT - ASSESSMENT
67 y/o male with PMH of  TIA,  HTN came to the ED complaining of gradual progressive LE edema. Labs significant for JUANJO, hyperK and metabolic acidosis. CT w/ mod B/l HDN.    1. Acute kidney injury on CKD, NOS:  -JUANJO likely from obstructive uropathy     -Baseline SCr: unclear, SCr on admission 11.18 and today 1.2 mg/dl  -UA: pending  -Imaging: Moderate bilateral hydroureteronephrosis to the level of the mid/distal ureters without obstructing stone identified. Bilateral perinephric stranding. Distended bladder with mild wall thickening and numerous diverticula. Rec correlation with urinalysis. Bateman catheter misplaced with balloon in the penile urethra- rec repositioning.    -Bateman repositioned and patient is making great UOP  -Added Flomax  -SCr improved to 1.2    2.Hyperkalemia:  -Improved    3. Metabolic acidosis:  -Improved    4. Anemia: iron deficiency, added ferrous sulfate.     5. Volume overload: improving w/ improvement in JUANJO.    Nephrology will sign off, thanks for the consult.

## 2023-08-25 NOTE — CHART NOTE - NSCHARTNOTEFT_GEN_A_CORE
Follow up note Post LHC/  Site check    Evaluated the patient at bedside, patient CP free, VSS      Vital Signs Last 24 Hrs  T(C): 36.7 (25 Aug 2023 08:18), Max: 36.8 (24 Aug 2023 12:00)  T(F): 98.1 (25 Aug 2023 08:18), Max: 98.3 (24 Aug 2023 17:10)  HR: 73 (25 Aug 2023 08:18) (66 - 88)  BP: 121/71 (25 Aug 2023 08:18) (102/61 - 159/87)  BP(mean): --  RR: 18 (25 Aug 2023 08:18) (12 - 18)  SpO2: 95% (25 Aug 2023 08:18) (91% - 100%)    Parameters below as of 25 Aug 2023 08:18  Patient On (Oxygen Delivery Method): room air      	  68y Male with past medical history significant for HTN HDL CVA who presents to [facility] dyspnea orthopnea weight gain and complaining of LE edema x 1 week.  Patient said he has not taken his medications in about 8 months because he ran out and could not secure an appointment with his PCP. He has no chest pain, nausea, vomiting, palpitation, fever, recent travel, calf pain, HA.  Patient found to have JUANJO likely due to obstructive  uropathy improving.  TTE EF 20 to 25% DDD II mild PHTN LA severly enlarged moderate MR compared to prior TTE 2017 new biventricular systolic failure.  Presents for LHC/RHC with Dr. Lott for further evaluation.  -s/p LHC/RHC via RFA/RFV with Dr. Lott: mild CAD; elevated filling pressures   -RFA Mynx/RFV site benign without hematoma/bleeding; no bruit; + right PP    # S/P LHC VIA RFA  Right groin benign with no bleeding/hematoma  Distal pulses 2+  NV sattus intact  Further management per Cardiology/ hospitalist  Interventional cardiology signing off    Liana Wallace FNP BC, VIRGILP-BC  [place] via teams

## 2023-08-25 NOTE — PROGRESS NOTE ADULT - PROVIDER SPECIALTY LIST ADULT
Cardiology
Intervent Cardiology
Cardiology
Internal Medicine
Nephrology
Internal Medicine
Nephrology
Hospitalist

## 2023-08-25 NOTE — PHYSICAL THERAPY INITIAL EVALUATION ADULT - PERTINENT HX OF CURRENT PROBLEM, REHAB EVAL
67 y/o male with PMH of  TIA,  HTN came to the ED complaining of LE edema x 1 week. Patient first noticed swelling of the LLE but later both extremities extending to his abdomen. Reported lower abdominal pain around his waist, decrease urination, dyspnea on exertion, orthopnea, chills and decrease appetite in the last 4 days. Of note, patient said he has not taken his medications in about 8 months because he ran out and could not secure an appointment with his PCP. He has no chest pain, nausea, vomiting, palpitation, fever, recent travel, calf pain, HA.

## 2023-08-25 NOTE — PROGRESS NOTE ADULT - SUBJECTIVE AND OBJECTIVE BOX
[facility] - [facility], [facility]                                   [address] [place] [address]                                                      PHONE: [phone]                                                         FAX: [phone]  [email or web address]  ---------------------------------------------------------------------------------------------------------------------------------    Overnight events/patient complaints:    NAD feeling well today     No Known Allergies    MEDICATIONS  (STANDING):  aspirin enteric coated 81 milliGRAM(s) Oral daily  atorvastatin 80 milliGRAM(s) Oral at bedtime  carvedilol 6.25 milliGRAM(s) Oral every 12 hours  heparin   Injectable 5000 Unit(s) SubCutaneous every 8 hours  losartan 50 milliGRAM(s) Oral daily  nicotine - 21 mG/24Hr(s) Patch 1 Patch Transdermal daily  tamsulosin 0.4 milliGRAM(s) Oral at bedtime    MEDICATIONS  (PRN):  acetaminophen     Tablet .. 650 milliGRAM(s) Oral every 6 hours PRN Temp greater or equal to 38C (100.4F), Moderate Pain (4 - 6), Severe Pain (7 - 10)  aluminum hydroxide/magnesium hydroxide/simethicone Suspension 30 milliLiter(s) Oral every 4 hours PRN Dyspepsia  melatonin 3 milliGRAM(s) Oral at bedtime PRN Insomnia  ondansetron Injectable 4 milliGRAM(s) IV Push every 8 hours PRN Nausea and/or Vomiting      Vital Signs Last 24 Hrs  T(C): 36.7 (25 Aug 2023 08:18), Max: 36.8 (24 Aug 2023 12:00)  T(F): 98.1 (25 Aug 2023 08:18), Max: 98.3 (24 Aug 2023 17:10)  HR: 73 (25 Aug 2023 08:18) (66 - 88)  BP: 121/71 (25 Aug 2023 08:18) (102/61 - 159/87)  BP(mean): --  RR: 18 (25 Aug 2023 08:18) (12 - 18)  SpO2: 95% (25 Aug 2023 08:18) (91% - 100%)    Parameters below as of 25 Aug 2023 08:18  Patient On (Oxygen Delivery Method): room air      ICU Vital Signs Last 24 Hrs  MARIANO REEVES  I&O's Detail    24 Aug 2023 07:01  -  25 Aug 2023 07:00  --------------------------------------------------------  IN:  Total IN: 0 mL    OUT:    Indwelling Catheter - Urethral (mL): 2200 mL  Total OUT: 2200 mL    Total NET: -2200 mL        I&O's Summary    24 Aug 2023 07:01  -  25 Aug 2023 07:00  --------------------------------------------------------  IN: 0 mL / OUT: 2200 mL / NET: -2200 mL      Drug Dosing Weight  MARIANO REEVES      PHYSICAL EXAM:  General: Appears well developed, alert and cooperative.  HEENT: Head; normocephalic, atraumatic.  Eyes: Pupils reactive, cornea wnl.  Neck: Supple, no nodes adenopathy, no NVD or carotid bruit or thyromegaly.  CARDIOVASCULAR: Normal S1 and S2, No murmur, rub, gallop or lift.   LUNGS: No rales, rhonchi or wheeze. Normal breath sounds bilaterally.  ABDOMEN: Soft, nontender without mass or organomegaly. bowel sounds normoactive.  EXTREMITIES: No clubbing, cyanosis or edema. Distal pulses wnl.   SKIN: warm and dry with normal turgor.  NEURO: Alert/oriented x 3/normal motor exam. No pathologic reflexes.    PSYCH: normal affect.        LABS:                        10.4   6.63  )-----------( 306      ( 24 Aug 2023 06:08 )             30.9     08-25    140  |  106  |  16.3  ----------------------------<  96  4.0   |  21.0<L>  |  0.98    Ca    8.7      25 Aug 2023 06:47  Mg     1.6     08-25      MARIANO REEVES        Urinalysis Basic - ( 25 Aug 2023 06:47 )    Color: x / Appearance: x / SG: x / pH: x  Gluc: 96 mg/dL / Ketone: x  / Bili: x / Urobili: x   Blood: x / Protein: x / Nitrite: x   Leuk Esterase: x / RBC: x / WBC x   Sq Epi: x / Non Sq Epi: x / Bacteria: x        RADIOLOGY & ADDITIONAL STUDIES:    INTERPRETATION OF TELEMETRY (personally reviewed):     >    < from: CT Abdomen and Pelvis No Cont (08.22.23 @ 04:06) >  ACC: [id number] EXAM:  CT ABDOMEN AND PELVIS   ORDERED BY: NELL JACKMAN     PROCEDURE DATE:  08/22/2023          INTERPRETATION:  CLINICAL INFORMATION: Lower abdominal pain, decreased   urination.  Acute kidney injury.    COMPARISON: CT scan abdomen pelvis 3/23/2012.    CONTRAST/COMPLICATIONS:  IV Contrast: None.  Oral Contrast: None.  Complications: None reported at time of exam completion.    PROCEDURE:  CT of the Abdomen and Pelvis was performed.  Sagittal and coronal reformats were performed.    FINDINGS:    LOWER CHEST:  Bilateral pleural effusions, right greater than left.  Interlobular septal thickening likely reflecting interstitial edema.    Evaluation of the solid organ parenchyma is limited without intravenous   contrast.    LIVER: Calcified granuloma right hepatic lobe.  BILE DUCTS: Normal caliber.  GALLBLADDER: Within normal limits.  SPLEEN: Within normal limits.  PANCREAS: Within normal limits.  ADRENALS: Within normal limits.  KIDNEYS/URETERS:  Moderate bilateral hydroureteronephrosis to the level of the mid/distal   ureters without obstructing calculus identified.  Bilateral perinephric stranding.    BLADDER: Markedly distended urinary bladder with mild bladder wall   thickening and several large bladder diverticuli.  REPRODUCTIVE ORGANS: Balloon Bateman catheter inflated at the penile   urethra.    BOWEL:  Retained contrast within the colon; no bowel obstruction.  Appendix is normal.  PERITONEUM: No ascites.    VESSELS: Atherosclerotic changes.  RETROPERITONEUM/LYMPH NODES: No lymphadenopathy.    ABDOMINAL WALL:  Small fat-containing right inguinal hernia.    BONES:  Degenerative changes.  Age indeterminate compression deformity L1.    IMPRESSION:    Moderate bilateral hydroureteronephrosis without obstructing ureteral   calculus demonstrated.  Bilateral perinephric stranding.  Correlate clinically for urinary tract infection.    Distended urinary bladder.  Bateman catheter balloon inflated at the level of the penile urethra.  Recommend repositioning.    Other findings as discussed above.    This study was preliminary reported by the ED radiologist on 8/22/2023,   4:37 AM.          --- End of Report ---    < end of copied text >      < from: TTE Echo Complete w/ Contrast w/ Doppler (08.22.23 @ 18:36) >    Summary:   1. Left ventricular ejection fraction, by visual estimation, is 20 to   25%.   2. Severely decreased global left ventricular systolic function.   3. Spectral Doppler shows pseudonormal pattern of left ventricular   myocardial filling (Grade II diastolic dysfunction).   4. Mildy enlarged right ventricle with mildly reduced RV systolic   function.   5. Estimated pulmonary artery systolic pressure is 49.0 mmHg assuming a   right atrial pressure of 8 mmHg, which is consistent with mild pulmonary   hypertension.   6. Severely enlarged left atrium.   7. Moderate mitral valve regurgitation.   8. The mitral valveleaflets are tethered which is due to reduced   systolic function and elevated LVDP.   9. Mild-moderate tricuspid regurgitation.  10. Mild pulmonic valve regurgitation.  11. Trivial pericardial effusion.  12. Compared to the prior TTE study from 10/5/17, biventricular systolic   dysfunction present.    Mayur Hinojosa DO Electronically signed on 8/23/2023 at 8:40:35 AM    < end of copied text >      < from: Cardiac Catheterization (08.24.23 @ 18:07) >  Procedures Performed   Procedures:              1.    Ultrasound Guided Access     2.    Arterial Access - Right Femoral   3.    Venous Access - Right Femoral   4.RHC   5.    Diagnostic Coronary Angiography   6.    Left Heart Cath   7.    Mynx     Diagnostic Conclusions:     Moderate pulm HTN w/ PCW 20-30.   Mild LAD/diagonal disease.   EF 30/min MR.   Recommendations:     Med tx/Lifevest.     Acute complication:    No complications     Procedure Narrative:   The risks and alternatives of the procedures and conscious sedation  were explained to the patient and informed consent was  obtained. The patient was brought to the cath lab and placed on the  exam table.  Access   Right femoral artery:   Vascular access was obtained using modified seldinger technique.    Right femoral vein:   Vascular access was obtained using modified seldinger technique.      Coronary Angiography   Left Coronary System:   A catheter was positioned into the vessel ostia under fluoroscopic  guidance. Angiograms were obtained in multiple views.  Right Coronary System:   A catheter was positioned into the vessel ostia under fluoroscopic  guidance. Angiograms were obtained in multiple views.    Right Heart Cath   Measurements of pressures were obtained.      Patient: MARIANO REEVES              MRN: [id number]  Study Date: 08/24/2023   06:07 PM      Page 1 of 4    < end of copied text >          Assessment and Plan:  In summary, MARIANO REEVES is an 68y Male with past medical history significant for HTN HDL CVA who presents to [facility] dyspnea orthopnea weight gain and complaining of LE edema x 1 week. Patient first noticed swelling of the LLE but later both extremities extending to his abdomen. Reported lower abdominal pain around his waist, decrease urination, dyspnea on exertion, orthopnea.  Patient said he has not taken his medications in about 8 months because he ran out and could not secure an appointment with his PCP. He has no chest pain, nausea, vomiting, palpitation, fever, recent travel, calf pain, HA.  Patient found to have JUANJO likely due to obstructive  uropathy improving.      TTE EF 20 to 25% DDD II mild PHTN LA severly enlarged moderate MR compared to prior TTE 2017 new biventricular systolic failure     s/p left heart mild CAD     HFrEF C/W with NICM       Plan  Lifevest therapy   Coreg 6.25 mg po BID   DC Hydralazine 25 mg po TID   Losartan 50 mg po Daily for HFrEF   ASA and statin therapy     Out patient cardiac follow up     DC planning

## 2023-08-25 NOTE — DISCHARGE NOTE NURSING/CASE MANAGEMENT/SOCIAL WORK - NSDCPEFALRISK_GEN_ALL_CORE
For information on Fall & Injury Prevention, visit: [email or web address] OR  [email or web address] OR  https://www.cdc.gov/steadi/patient.html

## 2023-08-25 NOTE — DISCHARGE NOTE NURSING/CASE MANAGEMENT/SOCIAL WORK - PATIENT PORTAL LINK FT
You can access the FollowMyHealth Patient Portal offered by [facility] by registering at the following website: [email or web address]. By joining [employer]’s FollowMyHealth portal, you will also be able to view your health information using other applications (apps) compatible with our system.

## 2023-08-25 NOTE — DISCHARGE NOTE NURSING/CASE MANAGEMENT/SOCIAL WORK - NSDCFUADDAPPT_GEN_ALL_CORE_FT
Follow up at [facility] Cardiology in one to two weeks     NEW MD at [place] [id number] [facility] [address]  for next Tuesday 8 /29 9:15am with Dr Sheela Casper [phone]. Follow up at [facility] Cardiology in one to two weeks     NEW MD at [facility] [address] [facility] [address]  for next Tuesday 8 /29 9:15am with Dr Sheela Casper [phone].  Call [employer] [phone]- 3 DAYS in adv of medical appointments.  Follow up at [facility] Cardiology in one to two weeks     NEW MD at [facility] [address] [facility] [address]  for next Tuesday 8 /29 9:15am with Dr Sheela Casper [phone].  Call [employer] [phone]- 3 DAYS in adv of medical appointments.     Transport set up as 1 time courtesy for above appointment,  from home at 8:25am and  after completion of appointment at for 11:30am, resv# [phone] Follow up at [facility] Cardiology in one to two weeks     NEW MD at [facility] [address] [facility] [address]  for next Tuesday 8 /29 9:15am with Dr Sheela Casper [phone].  Call [employer] [phone]- 3 DAYS in adv of medical appointments.     Transport set up as 1 time courtesy for above appointment for [place] at 9:15 am (ABOVE)  from home at 8:25am and  after completion of appointment at for 11:30am, resv# [phone]

## 2023-09-13 ENCOUNTER — APPOINTMENT (OUTPATIENT)
Dept: UROLOGY | Facility: CLINIC | Age: 68
End: 2023-09-13
Payer: MEDICARE

## 2023-09-13 VITALS
RESPIRATION RATE: 16 BRPM | OXYGEN SATURATION: 100 % | HEART RATE: 70 BPM | DIASTOLIC BLOOD PRESSURE: 76 MMHG | SYSTOLIC BLOOD PRESSURE: 157 MMHG

## 2023-09-13 PROCEDURE — 99204 OFFICE O/P NEW MOD 45 MIN: CPT | Mod: 25

## 2023-09-13 PROCEDURE — A4216: CPT | Mod: NC

## 2023-09-13 PROCEDURE — 51700 IRRIGATION OF BLADDER: CPT

## 2023-09-13 RX ORDER — CARVEDILOL 6.25 MG/1
6.25 TABLET, FILM COATED ORAL TWICE DAILY
Qty: 30 | Refills: 0 | Status: ACTIVE | COMMUNITY
Start: 2023-09-13

## 2023-09-13 RX ORDER — AMLODIPINE BESYLATE 10 MG/1
10 TABLET ORAL DAILY
Qty: 30 | Refills: 0 | Status: DISCONTINUED | COMMUNITY
End: 2023-09-13

## 2023-09-13 RX ORDER — LISINOPRIL 2.5 MG/1
2.5 TABLET ORAL DAILY
Qty: 14 | Refills: 0 | Status: DISCONTINUED | COMMUNITY
Start: 2020-10-19 | End: 2023-09-13

## 2023-09-21 ENCOUNTER — APPOINTMENT (OUTPATIENT)
Dept: UROLOGY | Facility: CLINIC | Age: 68
End: 2023-09-21
Payer: MEDICARE

## 2023-09-21 VITALS — SYSTOLIC BLOOD PRESSURE: 118 MMHG | DIASTOLIC BLOOD PRESSURE: 65 MMHG | HEART RATE: 78 BPM

## 2023-09-21 PROCEDURE — 52000 CYSTOURETHROSCOPY: CPT

## 2023-09-21 PROCEDURE — 99213 OFFICE O/P EST LOW 20 MIN: CPT | Mod: 25

## 2023-09-21 RX ORDER — GENTAMICIN SULFATE 40 MG/ML
40 INJECTION, SOLUTION INTRAMUSCULAR; INTRAVENOUS
Qty: 1 | Refills: 0 | Status: COMPLETED | OUTPATIENT
Start: 2023-09-21

## 2023-09-21 RX ADMIN — GENTAMICIN SULFATE 0 MG/ML: 40 INJECTION, SOLUTION INTRAMUSCULAR; INTRAVENOUS at 00:00

## 2023-10-04 ENCOUNTER — NON-APPOINTMENT (OUTPATIENT)
Age: 68
End: 2023-10-04

## 2023-10-13 ENCOUNTER — OUTPATIENT (OUTPATIENT)
Dept: OUTPATIENT SERVICES | Facility: HOSPITAL | Age: 68
LOS: 1 days | End: 2023-10-13
Payer: MEDICARE

## 2023-10-13 VITALS
TEMPERATURE: 97 F | OXYGEN SATURATION: 95 % | WEIGHT: 156.53 LBS | HEIGHT: 69 IN | SYSTOLIC BLOOD PRESSURE: 120 MMHG | RESPIRATION RATE: 20 BRPM | HEART RATE: 68 BPM | DIASTOLIC BLOOD PRESSURE: 70 MMHG

## 2023-10-13 DIAGNOSIS — I10 ESSENTIAL (PRIMARY) HYPERTENSION: ICD-10-CM

## 2023-10-13 DIAGNOSIS — Z13.89 ENCOUNTER FOR SCREENING FOR OTHER DISORDER: ICD-10-CM

## 2023-10-13 DIAGNOSIS — Z98.890 OTHER SPECIFIED POSTPROCEDURAL STATES: Chronic | ICD-10-CM

## 2023-10-13 DIAGNOSIS — Z29.9 ENCOUNTER FOR PROPHYLACTIC MEASURES, UNSPECIFIED: ICD-10-CM

## 2023-10-13 DIAGNOSIS — I50.22 CHRONIC SYSTOLIC (CONGESTIVE) HEART FAILURE: ICD-10-CM

## 2023-10-13 DIAGNOSIS — R33.9 RETENTION OF URINE, UNSPECIFIED: ICD-10-CM

## 2023-10-13 DIAGNOSIS — Z01.818 ENCOUNTER FOR OTHER PREPROCEDURAL EXAMINATION: ICD-10-CM

## 2023-10-13 LAB
A1C WITH ESTIMATED AVERAGE GLUCOSE RESULT: 5.7 % — HIGH (ref 4–5.6)
ANION GAP SERPL CALC-SCNC: 16 MMOL/L — SIGNIFICANT CHANGE UP (ref 5–17)
APPEARANCE UR: CLEAR — SIGNIFICANT CHANGE UP
APTT BLD: 31.1 SEC — SIGNIFICANT CHANGE UP (ref 24.5–35.6)
BACTERIA # UR AUTO: ABNORMAL
BASOPHILS # BLD AUTO: 0.03 K/UL — SIGNIFICANT CHANGE UP (ref 0–0.2)
BASOPHILS NFR BLD AUTO: 0.4 % — SIGNIFICANT CHANGE UP (ref 0–2)
BILIRUB UR-MCNC: NEGATIVE — SIGNIFICANT CHANGE UP
BLD GP AB SCN SERPL QL: SIGNIFICANT CHANGE UP
BUN SERPL-MCNC: 11.5 MG/DL — SIGNIFICANT CHANGE UP (ref 8–20)
CALCIUM SERPL-MCNC: 9.6 MG/DL — SIGNIFICANT CHANGE UP (ref 8.4–10.5)
CHLORIDE SERPL-SCNC: 103 MMOL/L — SIGNIFICANT CHANGE UP (ref 96–108)
CO2 SERPL-SCNC: 23 MMOL/L — SIGNIFICANT CHANGE UP (ref 22–29)
COLOR SPEC: YELLOW — SIGNIFICANT CHANGE UP
CREAT SERPL-MCNC: 0.78 MG/DL — SIGNIFICANT CHANGE UP (ref 0.5–1.3)
DIFF PNL FLD: ABNORMAL
EGFR: 98 ML/MIN/1.73M2 — SIGNIFICANT CHANGE UP
EOSINOPHIL # BLD AUTO: 0.15 K/UL — SIGNIFICANT CHANGE UP (ref 0–0.5)
EOSINOPHIL NFR BLD AUTO: 2 % — SIGNIFICANT CHANGE UP (ref 0–6)
EPI CELLS # UR: NEGATIVE — SIGNIFICANT CHANGE UP
ESTIMATED AVERAGE GLUCOSE: 117 MG/DL — HIGH (ref 68–114)
GLUCOSE SERPL-MCNC: 98 MG/DL — SIGNIFICANT CHANGE UP (ref 70–99)
GLUCOSE UR QL: NEGATIVE — SIGNIFICANT CHANGE UP
HCT VFR BLD CALC: 39.8 % — SIGNIFICANT CHANGE UP (ref 39–50)
HGB BLD-MCNC: 12.9 G/DL — LOW (ref 13–17)
IMM GRANULOCYTES NFR BLD AUTO: 0.5 % — SIGNIFICANT CHANGE UP (ref 0–0.9)
INR BLD: 0.98 RATIO — SIGNIFICANT CHANGE UP (ref 0.85–1.18)
KETONES UR-MCNC: NEGATIVE — SIGNIFICANT CHANGE UP
LEUKOCYTE ESTERASE UR-ACNC: ABNORMAL
LYMPHOCYTES # BLD AUTO: 2.46 K/UL — SIGNIFICANT CHANGE UP (ref 1–3.3)
LYMPHOCYTES # BLD AUTO: 32.8 % — SIGNIFICANT CHANGE UP (ref 13–44)
MCHC RBC-ENTMCNC: 30.1 PG — SIGNIFICANT CHANGE UP (ref 27–34)
MCHC RBC-ENTMCNC: 32.4 GM/DL — SIGNIFICANT CHANGE UP (ref 32–36)
MCV RBC AUTO: 93 FL — SIGNIFICANT CHANGE UP (ref 80–100)
MONOCYTES # BLD AUTO: 0.51 K/UL — SIGNIFICANT CHANGE UP (ref 0–0.9)
MONOCYTES NFR BLD AUTO: 6.8 % — SIGNIFICANT CHANGE UP (ref 2–14)
NEUTROPHILS # BLD AUTO: 4.31 K/UL — SIGNIFICANT CHANGE UP (ref 1.8–7.4)
NEUTROPHILS NFR BLD AUTO: 57.5 % — SIGNIFICANT CHANGE UP (ref 43–77)
NITRITE UR-MCNC: POSITIVE
PH UR: 6 — SIGNIFICANT CHANGE UP (ref 5–8)
PLATELET # BLD AUTO: 305 K/UL — SIGNIFICANT CHANGE UP (ref 150–400)
POTASSIUM SERPL-MCNC: 4.6 MMOL/L — SIGNIFICANT CHANGE UP (ref 3.5–5.3)
POTASSIUM SERPL-SCNC: 4.6 MMOL/L — SIGNIFICANT CHANGE UP (ref 3.5–5.3)
PROT UR-MCNC: NEGATIVE — SIGNIFICANT CHANGE UP
PROTHROM AB SERPL-ACNC: 10.9 SEC — SIGNIFICANT CHANGE UP (ref 9.5–13)
RBC # BLD: 4.28 M/UL — SIGNIFICANT CHANGE UP (ref 4.2–5.8)
RBC # FLD: 13.6 % — SIGNIFICANT CHANGE UP (ref 10.3–14.5)
RBC CASTS # UR COMP ASSIST: ABNORMAL /HPF (ref 0–4)
SODIUM SERPL-SCNC: 142 MMOL/L — SIGNIFICANT CHANGE UP (ref 135–145)
SP GR SPEC: 1.01 — SIGNIFICANT CHANGE UP (ref 1.01–1.02)
UROBILINOGEN FLD QL: NEGATIVE — SIGNIFICANT CHANGE UP
WBC # BLD: 7.5 K/UL — SIGNIFICANT CHANGE UP (ref 3.8–10.5)
WBC # FLD AUTO: 7.5 K/UL — SIGNIFICANT CHANGE UP (ref 3.8–10.5)
WBC UR QL: ABNORMAL /HPF (ref 0–5)

## 2023-10-13 PROCEDURE — 93005 ELECTROCARDIOGRAM TRACING: CPT

## 2023-10-13 PROCEDURE — G0463: CPT

## 2023-10-13 PROCEDURE — 93010 ELECTROCARDIOGRAM REPORT: CPT

## 2023-10-13 NOTE — H&P PST ADULT - PROBLEM SELECTOR PLAN 1
69 y/o male with PMH of HTN, ARF, Acute systolic heart failure with external defibrillator, pre DM, anemia, stroke, carotid stenosis now with retention of urine scheduled for TURP on 10/27/2023.      -Medical evaluation pending  -Cardiac evaluation pending  - NPO after midnight the night before surgery except for meds  -Educated on NSAIDS, multivitamins and herbals that increase the risk of bleeding and need to be stopped 5 days before procedure  -Educated on infection prevention  -ASA per cardiologist  -Tylenol can be taken 5 days before surgery if needed for pain  -Will continue all other medications as prescribed  -Verbalized understanding of all instructions.

## 2023-10-13 NOTE — H&P PST ADULT - CIGARETTES, PACKS/DAY
No    Treatment/Activity Tolerance:  [] Patient tolerated treatment well [] Patient limited by fatique  [x] Patient limited by pain  [] Patient limited by other medical complications  [x] Other: Patient with fair tolerance to session. Patient was educated on the importance of proper footwear in decreasing RLE soreness, as she has presented in sandals to all PT session. Strengthening tolerated fairly, though continued low tolerance to extension based activities. Again this date, the patient was educated on the importance of full extension for quad activation. Patient also demonstrating fear avoidance during SLS, though this is included in her HEP. She was educated on the importance of functional stability and functional strength in order to decrease risk of falls in community. Patient required verbal cueing during squats to distribute weight equally L vs R, though minimal corrections were made with cueing. Patient requires continued skilled physical therapy to address remaining ROM and functional mobility restrictions in order to return to her pain free PLOF. Patient education: HEP, POC    PLAN: See eval  [] Continue per plan of care [] Alter current plan (see comments above)  [x] Plan of care initiated [] Hold pending MD visit [] Discharge      Electronically signed by:  Valente Erwin, PT, DPT    Note: If patient does not return for scheduled/ recommended follow up visits, this note will serve as a discharge from care along with most recent update on progress. 0.5

## 2023-10-13 NOTE — H&P PST ADULT - NSICDXFAMILYHX_GEN_ALL_CORE_FT
FAMILY HISTORY:  Father  Still living? Unknown  Family history of lung cancer, Age at diagnosis: Age Unknown    Mother  Still living? Unknown  FH: diabetes mellitus, Age at diagnosis: Age Unknown  FHx: heart disease, Age at diagnosis: Age Unknown    Sibling  Still living? Unknown  Family history of lung cancer, Age at diagnosis: Age Unknown

## 2023-10-13 NOTE — H&P PST ADULT - BLOOD AVOIDANCE/RESTRICTIONS, PROFILE
Nutrition Assessment   Assessment Type: Initial assessment  Reason for Visit: Consult  Referral Requested By: Physician/Staff  Chart Medications Lab Results Reviewed:  Yes    Nutritional Risk Factors: Skin breakdown and Vent support    Current Diet Order: NPO  Food Allergies: No  Priority Points: Status 2    Demographic/Anthropometrics Information  Gender: female   Patient Age: 75 year old  Height:    Ht Readings from Last 1 Encounters:   10/27/21 5' 2\" (1.575 m)      Weight:   Wt Readings from Last 1 Encounters:   10/27/21 65 kg      BMI:   BMI Readings from Last 1 Encounters:   10/27/21 26.21 kg/m²     Weight Classification: Overweight (BMI 25-29.9)    Estimated Nutritional Needs  Assessment Weight: 65 kg  Energy Needs: 25 kcal/kg = 1625 kcals/day  Protein Needs: 1.5 g/kg = 98 grams/day  Fluid Needs: per team    Nutrition Diagnosis (PES)  Increased nutrient needs related to Increased nutritional demands for healing as evidenced by Calculated needs    Nutrition Plan  Recommended Nutrition Intervention: enteral nutrition  Monitor: Biochemical data, medical tests, procedures and Enteral nutrition    Discharge Needs: Pending  Care Plan Discussed With: Patient unable to participate in plan of care  Goals: Tolerate enteral feeding goal  Goal Progress: Initiated  Timeframe to Achieve Goal: 1-3 days    Dietitian Notes/Impressions/Recommendations:  Intubation, NGT noted. Rec initiation of Promote with fiber @ 70 ml/hr to provide 1680 kcals (103% of needs) and 104 gm protein (106% of needs). D/W resident.    Consult received for wound care. May benefit from Charly BID to aid in wound healing once pressors are discontinued.     NFPE: deferred    TREATMENT PLAN: Monitoring & Interventions   1. Diet: Recommend Promote with fiber @ 70 ml/hr.  2. Nutrition Supplement: Recommend Charly BID once off pressors.   3. RD monitoring intake trends, weight, labs. Further recommendations based on clinical course.          none

## 2023-10-13 NOTE — H&P PST ADULT - ASSESSMENT
69 y/o male with PMH of HTN, ARF, Acute systolic heart failure with external defibrillator, pre DM, anemia, stroke, carotid stenosis now with retention of urine scheduled for TURP on 10/27/2023.      -Medical evaluation pending  -Cardiac evaluation pending  - NPO after midnight the night before surgery except for meds  -Educated on NSAIDS, multivitamins and herbals that increase the risk of bleeding and need to be stopped 5 days before procedure  -Educated on infection prevention  -ASA per cardiologist  -Tylenol can be taken 5 days before surgery if needed for pain  -Will continue all other medications as prescribed  -Verbalized understanding of all instructions.    OPIOID RISK TOOL    GRACIELA EACH BOX THAT APPLIES AND ADD TOTALS AT THE END    FAMILY HISTORY OF SUBSTANCE ABUSE                 FEMALE         MALE                                                Alcohol                             [  ]1 pt          [  ]3pts                                               Illegal Durgs                     [  ]2 pts        [  ]3pts                                               Rx Drugs                           [  ]4 pts        [  ]4 pts    PERSONAL HISTORY OF SUBSTANCE ABUSE                                                                                          Alcohol                             [  ]3 pts       [  ]3 pts                                               Illegal Drugs                     [  ]4 pts        [  ]4 pts                                               Rx Drugs                           [  ]5 pts        [  ]5 pts    AGE BETWEEN 16-45 YEARS                                      [  ]1 pt         [  ]1 pt    HISTORY OF PREADOLESCENT   SEXUAL ABUSE                                                             [  ]3 pts        [  ]0pts    PSYCHOLOGICAL DISEASE                     ADD, OCD, Bipolar, Schizophrenia        [  ]2 pts         [  ]2 pts                      Depression                                               [  ]1 pt           [  ]1 pt           SCORING TOTAL   (add numbers and type here)              (0)                                     A score of 3 or lower indicated LOW risk for future opioid abuse  A score of 4 to 7 indicated moderate risk for future opioid abuse  A score of 8 or higher indicates a high risk for opioid abuse      CAPRINI SCORE [CLOT]    AGE RELATED RISK FACTORS                                                       MOBILITY RELATED FACTORS  [ ] Age 41-60 years                                            (1 Point)                  [ ] Bed rest                                                        (1 Point)  [x ] Age: 61-74 years                                           (2 Points)                 [ ] Plaster cast                                                   (2 Points)  [ ] Age= 75 years                                              (3 Points)                 [ ] Bed bound for more than 72 hours                 (2 Points)    DISEASE RELATED RISK FACTORS                                               GENDER SPECIFIC FACTORS  [ ] Edema in the lower extremities                       (1 Point)                  [ ] Pregnancy                                                     (1 Point)  [ ] Varicose veins                                               (1 Point)                  [ ] Post-partum < 6 weeks                                   (1 Point)             [x ] BMI > 25 Kg/m2                                            (1 Point)                  [ ] Hormonal therapy  or oral contraception          (1 Point)                 [ ] Sepsis (in the previous month)                        (1 Point)                  [ ] History of pregnancy complications                 (1 point)  [ ] Pneumonia or serious lung disease                                               [ ] Unexplained or recurrent                     (1 Point)           (in the previous month)                               (1 Point)  [ ] Abnormal pulmonary function test                     (1 Point)                 SURGERY RELATED RISK FACTORS  [ ] Acute myocardial infarction                              (1 Point)                 [ ]  Section                                             (1 Point)  [ ] Congestive heart failure (in the previous month)  (1 Point)               [ ] Minor surgery                                                  (1 Point)   [ ] Inflammatory bowel disease                             (1 Point)                 [ ] Arthroscopic surgery                                        (2 Points)  [ ] Central venous access                                      (2 Points)                [x ] General surgery lasting more than 45 minutes   (2 Points)       [ ] Stroke (in the previous month)                          (5 Points)               [ ] Elective arthroplasty                                         (5 Points)                                                                                                                                               HEMATOLOGY RELATED FACTORS                                                 TRAUMA RELATED RISK FACTORS  [ ] Prior episodes of VTE                                     (3 Points)                [ ] Fracture of the hip, pelvis, or leg                       (5 Points)  [ ] Positive family history for VTE                         (3 Points)                 [ ] Acute spinal cord injury (in the previous month)  (5 Points)  [ ] Prothrombin 74330 A                                     (3 Points)                 [ ] Paralysis  (less than 1 month)                             (5 Points)  [ ] Factor V Leiden                                             (3 Points)                  [ ] Multiple Trauma within 1 month                        (5 Points)  [ ] Lupus anticoagulants                                     (3 Points)                                                           [ ] Anticardiolipin antibodies                               (3 Points)                                                       [ ] High homocysteine in the blood                      (3 Points)                                             [ ] Other congenital or acquired thrombophilia      (3 Points)                                                [ ] Heparin induced thrombocytopenia                  (3 Points)                                          Total Score [    5      ]    Caprini Score 0 - 2:  Low Risk, No VTE Prophylaxis required for most patients, encourage ambulation  Caprini Score 3 - 6:  At Risk, pharmacologic VTE prophylaxis is indicated for most patients (in the absence of a contraindication)  Caprini Score Greater than or = 7:  High Risk, pharmacologic VTE prophylaxis is indicated for most patients (in the absence of a contraindication)

## 2023-10-13 NOTE — H&P PST ADULT - NSICDXPASTMEDICALHX_GEN_ALL_CORE_FT
PAST MEDICAL HISTORY:  Acute renal failure (ARF)     At risk for sleep apnea     H/O carotid stenosis     Heart failure, systolic, chronic     HTN (hypertension)     Prediabetes     Stroke

## 2023-10-13 NOTE — H&P PST ADULT - HISTORY OF PRESENT ILLNESS
?Has had boo in for 8/21. He states he wasn't able to void for three or four days without urinating. He was seen in [facility]. Boo placed. It did not drain. Ultimately had a CT scan. Boo was not in correct location . had cardiac work up as well. he had what sounds like significantly decrease cardiac output. He alos had creatinine elevated to over 5 and it resolved quickly once boo placed.  ?Current Meds  Aspirin 81 81 MG Oral Tablet Delayed Release; TAKE 1 TABLET DAILY  Atorvastatin Calcium 80 MG Oral Tablet; TAKE 1 TABLET AT BEDTIME       ?Abnormal chest CT (793.2) (R93.89)  Carotid stenosis (433.10) (I65.29)  COPD, mild (496) (J44.9)  Dyspnea on exertion (786.09) (R06.09)  GERD (gastroesophageal reflux disease) (530.81) (K21.9)  Nicotine dependence (305.1) (F17.200)  Stroke (434.91) (I63.9) 69 y/o male with PMH of HTN, ARF, Acute systolic heart failure with external defibrillator, pre DM, anemia, stroke, carotid stenosis presents to PST today. Pt. reports hx of uriary retention since 8/2023 and BLE edema. Pt. then went to [facility] and boo catheter was placed and was not draining. CT completed and showed that the boo was not placed correctly. Boo was then replaced. Currently has catheter in place. Denies any hematuria. Reports white discharged in his underwear for the past week. Denies any leakage of urine. Reports mild burning sensation around where catheter is located.       ?Has had boo in for 8/21. He states he wasn't able to void for three or four days without urinating. He was seen in [facility]. Boo placed. It did not drain. Ultimately had a CT scan. Boo was not in correct location . had cardiac work up as well. he had what sounds like significantly decrease cardiac output. He alos had creatinine elevated to over 5 and it resolved quickly once boo placed.  ?Current Meds  Aspirin 81 81 MG Oral Tablet Delayed Release; TAKE 1 TABLET DAILY  Atorvastatin Calcium 80 MG Oral Tablet; TAKE 1 TABLET AT BEDTIME       ?Abnormal chest CT (793.2) (R93.89)  Carotid stenosis (433.10) (I65.29)  COPD, mild (496) (J44.9)  Dyspnea on exertion (786.09) (R06.09)  GERD (gastroesophageal reflux disease) (530.81) (K21.9)  Nicotine dependence (305.1) (F17.200)  Stroke (434.91) (I63.9) 67 y/o male with PMH of HTN, ARF, Acute systolic heart failure with external defibrillator, pre DM, anemia, stroke, carotid stenosis presents to PST today. Pt. reports hx of urinary retention since 8/2023 and BLE edema. Pt. then went to [facility] and boo catheter was placed and was not draining. CT completed and showed that the boo was not placed correctly. Boo was then replaced. Currently has catheter in place, draining yellow urine. Denies any hematuria. Reports white discharged in his underwear for the past week. Denies any leakage of urine. Reports mild burning sensation around where catheter is located. Scheduled for TURP on 10/27/2023.

## 2023-10-13 NOTE — H&P PST ADULT - EKG AND INTERPRETATION
SUBJECTIVE:  The patient is a 27 year old male who presented requesting evaluation for left ankle pain and discomfort.  The patient reports that on Saturday he had fallen and twisted his left ankle.  He cannot recollect the exact details with the reported to nursing that he was drunk at the time.  He states that he was not able to bear any weight on immediately after.  The patient is able to partially bear weight presently.  However due to the pain discomfort is coming here for further evaluation and treatment.    OBJECTIVE:  PAST HISTORIES:  I have reviewed the past medical history, family history, social history, medications and allergies listed in the medical record as well as the nursing notes for this encounter.    PROBLEM LIST:  There is no problem list on file for this patient.      ALLERGIES:   Reviewed and updated in the EHR.    MEDICATIONS:   Reviewed and updated in the EHR.    REVIEW OF SYSTEMS:      Negative for: Fevers, chills, weight loss, change in vision, paresthesias, nausea, vomiting, lightheadedness, dizziness, shortness of breath, cough, chest pain, rashes, skin erythema, induration, joint pain, changes in bowel habit, changes in urinary habit, changes in mood.      PHYSICAL EXAM:  Visit Vitals  /74   Pulse 99   Temp 99.2 °F (37.3 °C) (Temporal)   Wt 69.3 kg   SpO2 97%     Constitutional:  Well-developed, well-nourished male in no acute distress.    Eye:  Pupils are equal, round and reactive to light, extraocular movements are  intact, normal conjunctivae.      HENT:  Normocephalic, oral mucosa is moist, no pharyngeal erythema.    Musculoskeletal:     Left Foot examination:    Appearance: No obvious swelling deformities noted  Full range of motion on inversion, eversion, plantarflexion and dorsiflexion.  Sensation intact throughout  Drawer test found to be negative  Over the anterior aspect of the lateral malleolus was notably the anterior talofibular ligament  Pulses  2+      Integumentary:  Warm, dry, pink.      Neurologic:  Alert, oriented times 3, Normal sensory, normal motor function, no focal defects.      Psychiatric:  Cooperative, appropriate mood and affect, normal judgment.       Assessment:  1. Acute left ankle pain        Plan:  Orders Placed This Encounter   • XR Ankle 3+ View Left     X-rays per my interpretation did not reveal any acute evidence of a fracture.  Findings are consistent with an ankle sprain.  The patient was provided a lace-up ankle support and prescribed anti-inflammatory medications.  Patient should keep the foot elevated and ice the area when possible.    If any changes worsening symptoms he should seek further medical care.  Otherwise, follow-up as needed    XR ANKLE 3+ VW LEFT  No follow-ups on file.  Instructions noted per AVS/patient instructions.  The patient indicates understanding of these issues and agrees with the plan.    Sinu rhythm with marked sinus arrhythmia  Nonspecific T wave abnormality  Prolonged QT  Pending final int.

## 2023-10-17 LAB
-  AMIKACIN: SIGNIFICANT CHANGE UP
-  AMIKACIN: SIGNIFICANT CHANGE UP
-  AMOXICILLIN/CLAVULANIC ACID: SIGNIFICANT CHANGE UP
-  AMOXICILLIN/CLAVULANIC ACID: SIGNIFICANT CHANGE UP
-  AMPICILLIN/SULBACTAM: SIGNIFICANT CHANGE UP
-  AMPICILLIN/SULBACTAM: SIGNIFICANT CHANGE UP
-  AMPICILLIN: SIGNIFICANT CHANGE UP
-  AMPICILLIN: SIGNIFICANT CHANGE UP
-  AZTREONAM: SIGNIFICANT CHANGE UP
-  AZTREONAM: SIGNIFICANT CHANGE UP
-  CEFAZOLIN: SIGNIFICANT CHANGE UP
-  CEFAZOLIN: SIGNIFICANT CHANGE UP
-  CEFEPIME: SIGNIFICANT CHANGE UP
-  CEFEPIME: SIGNIFICANT CHANGE UP
-  CEFOXITIN: SIGNIFICANT CHANGE UP
-  CEFOXITIN: SIGNIFICANT CHANGE UP
-  CEFTRIAXONE: SIGNIFICANT CHANGE UP
-  CEFTRIAXONE: SIGNIFICANT CHANGE UP
-  CEFUROXIME: SIGNIFICANT CHANGE UP
-  CEFUROXIME: SIGNIFICANT CHANGE UP
-  CIPROFLOXACIN: SIGNIFICANT CHANGE UP
-  CIPROFLOXACIN: SIGNIFICANT CHANGE UP
-  ERTAPENEM: SIGNIFICANT CHANGE UP
-  ERTAPENEM: SIGNIFICANT CHANGE UP
-  GENTAMICIN: SIGNIFICANT CHANGE UP
-  GENTAMICIN: SIGNIFICANT CHANGE UP
-  IMIPENEM: SIGNIFICANT CHANGE UP
-  IMIPENEM: SIGNIFICANT CHANGE UP
-  LEVOFLOXACIN: SIGNIFICANT CHANGE UP
-  LEVOFLOXACIN: SIGNIFICANT CHANGE UP
-  MEROPENEM: SIGNIFICANT CHANGE UP
-  MEROPENEM: SIGNIFICANT CHANGE UP
-  NITROFURANTOIN: SIGNIFICANT CHANGE UP
-  NITROFURANTOIN: SIGNIFICANT CHANGE UP
-  PIPERACILLIN/TAZOBACTAM: SIGNIFICANT CHANGE UP
-  PIPERACILLIN/TAZOBACTAM: SIGNIFICANT CHANGE UP
-  TOBRAMYCIN: SIGNIFICANT CHANGE UP
-  TOBRAMYCIN: SIGNIFICANT CHANGE UP
-  TRIMETHOPRIM/SULFAMETHOXAZOLE: SIGNIFICANT CHANGE UP
-  TRIMETHOPRIM/SULFAMETHOXAZOLE: SIGNIFICANT CHANGE UP
CULTURE RESULTS: SIGNIFICANT CHANGE UP
CULTURE RESULTS: SIGNIFICANT CHANGE UP
METHOD TYPE: SIGNIFICANT CHANGE UP
METHOD TYPE: SIGNIFICANT CHANGE UP
ORGANISM # SPEC MICROSCOPIC CNT: SIGNIFICANT CHANGE UP
SPECIMEN SOURCE: SIGNIFICANT CHANGE UP
SPECIMEN SOURCE: SIGNIFICANT CHANGE UP

## 2023-10-26 ENCOUNTER — TRANSCRIPTION ENCOUNTER (OUTPATIENT)
Age: 68
End: 2023-10-26

## 2023-10-27 ENCOUNTER — APPOINTMENT (OUTPATIENT)
Dept: UROLOGY | Facility: HOSPITAL | Age: 68
End: 2023-10-27

## 2023-10-27 ENCOUNTER — INPATIENT (INPATIENT)
Facility: HOSPITAL | Age: 68
LOS: 1 days | Discharge: HOME | DRG: 666 | End: 2023-10-29
Attending: UROLOGY | Admitting: UROLOGY
Payer: MEDICARE

## 2023-10-27 ENCOUNTER — RESULT REVIEW (OUTPATIENT)
Age: 68
End: 2023-10-27

## 2023-10-27 VITALS
HEIGHT: 68 IN | DIASTOLIC BLOOD PRESSURE: 90 MMHG | HEART RATE: 72 BPM | OXYGEN SATURATION: 100 % | TEMPERATURE: 98 F | RESPIRATION RATE: 18 BRPM | SYSTOLIC BLOOD PRESSURE: 161 MMHG | WEIGHT: 154.98 LBS

## 2023-10-27 DIAGNOSIS — Z98.890 OTHER SPECIFIED POSTPROCEDURAL STATES: Chronic | ICD-10-CM

## 2023-10-27 DIAGNOSIS — R33.9 RETENTION OF URINE, UNSPECIFIED: ICD-10-CM

## 2023-10-27 PROCEDURE — 52601 PROSTATECTOMY (TURP): CPT

## 2023-10-27 PROCEDURE — 88305 TISSUE EXAM BY PATHOLOGIST: CPT | Mod: 26

## 2023-10-27 RX ORDER — ACETAMINOPHEN 500 MG
975 TABLET ORAL ONCE
Refills: 0 | Status: COMPLETED | OUTPATIENT
Start: 2023-10-27 | End: 2023-10-27

## 2023-10-27 RX ORDER — SODIUM CHLORIDE 9 MG/ML
3 INJECTION INTRAMUSCULAR; INTRAVENOUS; SUBCUTANEOUS EVERY 8 HOURS
Refills: 0 | Status: DISCONTINUED | OUTPATIENT
Start: 2023-10-27 | End: 2023-10-27

## 2023-10-27 RX ORDER — CARVEDILOL PHOSPHATE 80 MG/1
6.25 CAPSULE, EXTENDED RELEASE ORAL EVERY 12 HOURS
Refills: 0 | Status: DISCONTINUED | OUTPATIENT
Start: 2023-10-27 | End: 2023-10-29

## 2023-10-27 RX ORDER — TAMSULOSIN HYDROCHLORIDE 0.4 MG/1
0.4 CAPSULE ORAL AT BEDTIME
Refills: 0 | Status: DISCONTINUED | OUTPATIENT
Start: 2023-10-27 | End: 2023-10-29

## 2023-10-27 RX ORDER — FENTANYL CITRATE 50 UG/ML
25 INJECTION INTRAVENOUS
Refills: 0 | Status: DISCONTINUED | OUTPATIENT
Start: 2023-10-27 | End: 2023-10-27

## 2023-10-27 RX ORDER — KETOROLAC TROMETHAMINE 30 MG/ML
15 SYRINGE (ML) INJECTION EVERY 6 HOURS
Refills: 0 | Status: DISCONTINUED | OUTPATIENT
Start: 2023-10-27 | End: 2023-10-27

## 2023-10-27 RX ORDER — ATORVASTATIN CALCIUM 80 MG/1
80 TABLET, FILM COATED ORAL AT BEDTIME
Refills: 0 | Status: DISCONTINUED | OUTPATIENT
Start: 2023-10-27 | End: 2023-10-29

## 2023-10-27 RX ORDER — IBUPROFEN 200 MG
600 TABLET ORAL EVERY 6 HOURS
Refills: 0 | Status: DISCONTINUED | OUTPATIENT
Start: 2023-10-27 | End: 2023-10-29

## 2023-10-27 RX ORDER — IBUPROFEN 200 MG
600 TABLET ORAL EVERY 6 HOURS
Refills: 0 | Status: DISCONTINUED | OUTPATIENT
Start: 2023-10-27 | End: 2023-10-27

## 2023-10-27 RX ORDER — MECLIZINE HCL 12.5 MG
12.5 TABLET ORAL DAILY
Refills: 0 | Status: DISCONTINUED | OUTPATIENT
Start: 2023-10-27 | End: 2023-10-29

## 2023-10-27 RX ORDER — OXYCODONE HYDROCHLORIDE 5 MG/1
5 TABLET ORAL EVERY 6 HOURS
Refills: 0 | Status: DISCONTINUED | OUTPATIENT
Start: 2023-10-27 | End: 2023-10-27

## 2023-10-27 RX ORDER — SACUBITRIL AND VALSARTAN 24; 26 MG/1; MG/1
1 TABLET, FILM COATED ORAL
Refills: 0 | Status: DISCONTINUED | OUTPATIENT
Start: 2023-10-27 | End: 2023-10-29

## 2023-10-27 RX ORDER — HEPARIN SODIUM 5000 [USP'U]/ML
5000 INJECTION INTRAVENOUS; SUBCUTANEOUS EVERY 12 HOURS
Refills: 0 | Status: DISCONTINUED | OUTPATIENT
Start: 2023-10-27 | End: 2023-10-29

## 2023-10-27 RX ORDER — OXYCODONE HYDROCHLORIDE 5 MG/1
2.5 TABLET ORAL EVERY 6 HOURS
Refills: 0 | Status: DISCONTINUED | OUTPATIENT
Start: 2023-10-27 | End: 2023-10-27

## 2023-10-27 RX ORDER — MECLIZINE HCL 12.5 MG
1 TABLET ORAL
Refills: 0 | DISCHARGE

## 2023-10-27 RX ORDER — ASPIRIN/CALCIUM CARB/MAGNESIUM 324 MG
81 TABLET ORAL DAILY
Refills: 0 | Status: DISCONTINUED | OUTPATIENT
Start: 2023-10-27 | End: 2023-10-29

## 2023-10-27 RX ORDER — SACUBITRIL AND VALSARTAN 24; 26 MG/1; MG/1
1 TABLET, FILM COATED ORAL
Refills: 0 | DISCHARGE

## 2023-10-27 RX ORDER — ACETAMINOPHEN 500 MG
975 TABLET ORAL EVERY 6 HOURS
Refills: 0 | Status: DISCONTINUED | OUTPATIENT
Start: 2023-10-27 | End: 2023-10-29

## 2023-10-27 RX ORDER — CEFAZOLIN SODIUM 1 G
2000 VIAL (EA) INJECTION ONCE
Refills: 0 | Status: DISCONTINUED | OUTPATIENT
Start: 2023-10-27 | End: 2023-10-27

## 2023-10-27 RX ADMIN — Medication 975 MILLIGRAM(S): at 23:49

## 2023-10-27 RX ADMIN — ATORVASTATIN CALCIUM 80 MILLIGRAM(S): 80 TABLET, FILM COATED ORAL at 22:29

## 2023-10-27 RX ADMIN — Medication 975 MILLIGRAM(S): at 16:51

## 2023-10-27 RX ADMIN — Medication 600 MILLIGRAM(S): at 23:49

## 2023-10-27 RX ADMIN — TAMSULOSIN HYDROCHLORIDE 0.4 MILLIGRAM(S): 0.4 CAPSULE ORAL at 22:29

## 2023-10-28 LAB
ANION GAP SERPL CALC-SCNC: 7 MMOL/L — SIGNIFICANT CHANGE UP (ref 5–17)
ANION GAP SERPL CALC-SCNC: 7 MMOL/L — SIGNIFICANT CHANGE UP (ref 5–17)
BUN SERPL-MCNC: 14 MG/DL — SIGNIFICANT CHANGE UP (ref 8–20)
BUN SERPL-MCNC: 14 MG/DL — SIGNIFICANT CHANGE UP (ref 8–20)
CALCIUM SERPL-MCNC: 8.5 MG/DL — SIGNIFICANT CHANGE UP (ref 8.4–10.5)
CALCIUM SERPL-MCNC: 8.5 MG/DL — SIGNIFICANT CHANGE UP (ref 8.4–10.5)
CHLORIDE SERPL-SCNC: 106 MMOL/L — SIGNIFICANT CHANGE UP (ref 96–108)
CHLORIDE SERPL-SCNC: 106 MMOL/L — SIGNIFICANT CHANGE UP (ref 96–108)
CO2 SERPL-SCNC: 27 MMOL/L — SIGNIFICANT CHANGE UP (ref 22–29)
CO2 SERPL-SCNC: 27 MMOL/L — SIGNIFICANT CHANGE UP (ref 22–29)
CREAT SERPL-MCNC: 0.76 MG/DL — SIGNIFICANT CHANGE UP (ref 0.5–1.3)
CREAT SERPL-MCNC: 0.76 MG/DL — SIGNIFICANT CHANGE UP (ref 0.5–1.3)
EGFR: 99 ML/MIN/1.73M2 — SIGNIFICANT CHANGE UP
EGFR: 99 ML/MIN/1.73M2 — SIGNIFICANT CHANGE UP
GLUCOSE SERPL-MCNC: 100 MG/DL — HIGH (ref 70–99)
GLUCOSE SERPL-MCNC: 100 MG/DL — HIGH (ref 70–99)
HCT VFR BLD CALC: 31.9 % — LOW (ref 39–50)
HCT VFR BLD CALC: 31.9 % — LOW (ref 39–50)
HGB BLD-MCNC: 10.4 G/DL — LOW (ref 13–17)
HGB BLD-MCNC: 10.4 G/DL — LOW (ref 13–17)
MCHC RBC-ENTMCNC: 30.2 PG — SIGNIFICANT CHANGE UP (ref 27–34)
MCHC RBC-ENTMCNC: 30.2 PG — SIGNIFICANT CHANGE UP (ref 27–34)
MCHC RBC-ENTMCNC: 32.6 GM/DL — SIGNIFICANT CHANGE UP (ref 32–36)
MCHC RBC-ENTMCNC: 32.6 GM/DL — SIGNIFICANT CHANGE UP (ref 32–36)
MCV RBC AUTO: 92.7 FL — SIGNIFICANT CHANGE UP (ref 80–100)
MCV RBC AUTO: 92.7 FL — SIGNIFICANT CHANGE UP (ref 80–100)
PLATELET # BLD AUTO: 209 K/UL — SIGNIFICANT CHANGE UP (ref 150–400)
PLATELET # BLD AUTO: 209 K/UL — SIGNIFICANT CHANGE UP (ref 150–400)
POTASSIUM SERPL-MCNC: 3.6 MMOL/L — SIGNIFICANT CHANGE UP (ref 3.5–5.3)
POTASSIUM SERPL-MCNC: 3.6 MMOL/L — SIGNIFICANT CHANGE UP (ref 3.5–5.3)
POTASSIUM SERPL-SCNC: 3.6 MMOL/L — SIGNIFICANT CHANGE UP (ref 3.5–5.3)
POTASSIUM SERPL-SCNC: 3.6 MMOL/L — SIGNIFICANT CHANGE UP (ref 3.5–5.3)
RBC # BLD: 3.44 M/UL — LOW (ref 4.2–5.8)
RBC # BLD: 3.44 M/UL — LOW (ref 4.2–5.8)
RBC # FLD: 13.6 % — SIGNIFICANT CHANGE UP (ref 10.3–14.5)
RBC # FLD: 13.6 % — SIGNIFICANT CHANGE UP (ref 10.3–14.5)
SODIUM SERPL-SCNC: 139 MMOL/L — SIGNIFICANT CHANGE UP (ref 135–145)
SODIUM SERPL-SCNC: 139 MMOL/L — SIGNIFICANT CHANGE UP (ref 135–145)
WBC # BLD: 6.68 K/UL — SIGNIFICANT CHANGE UP (ref 3.8–10.5)
WBC # BLD: 6.68 K/UL — SIGNIFICANT CHANGE UP (ref 3.8–10.5)
WBC # FLD AUTO: 6.68 K/UL — SIGNIFICANT CHANGE UP (ref 3.8–10.5)
WBC # FLD AUTO: 6.68 K/UL — SIGNIFICANT CHANGE UP (ref 3.8–10.5)

## 2023-10-28 RX ORDER — POTASSIUM CHLORIDE 20 MEQ
40 PACKET (EA) ORAL ONCE
Refills: 0 | Status: COMPLETED | OUTPATIENT
Start: 2023-10-28 | End: 2023-10-28

## 2023-10-28 RX ADMIN — HEPARIN SODIUM 5000 UNIT(S): 5000 INJECTION INTRAVENOUS; SUBCUTANEOUS at 07:04

## 2023-10-28 RX ADMIN — Medication 600 MILLIGRAM(S): at 00:30

## 2023-10-28 RX ADMIN — Medication 975 MILLIGRAM(S): at 00:00

## 2023-10-28 RX ADMIN — Medication 40 MILLIEQUIVALENT(S): at 11:47

## 2023-10-28 RX ADMIN — Medication 600 MILLIGRAM(S): at 11:47

## 2023-10-28 RX ADMIN — Medication 600 MILLIGRAM(S): at 17:52

## 2023-10-28 RX ADMIN — CARVEDILOL PHOSPHATE 6.25 MILLIGRAM(S): 80 CAPSULE, EXTENDED RELEASE ORAL at 07:03

## 2023-10-28 RX ADMIN — Medication 600 MILLIGRAM(S): at 07:04

## 2023-10-28 RX ADMIN — Medication 81 MILLIGRAM(S): at 11:47

## 2023-10-28 RX ADMIN — SACUBITRIL AND VALSARTAN 1 TABLET(S): 24; 26 TABLET, FILM COATED ORAL at 07:04

## 2023-10-28 RX ADMIN — Medication 975 MILLIGRAM(S): at 11:47

## 2023-10-28 RX ADMIN — CARVEDILOL PHOSPHATE 6.25 MILLIGRAM(S): 80 CAPSULE, EXTENDED RELEASE ORAL at 17:51

## 2023-10-28 RX ADMIN — Medication 975 MILLIGRAM(S): at 07:03

## 2023-10-28 RX ADMIN — TAMSULOSIN HYDROCHLORIDE 0.4 MILLIGRAM(S): 0.4 CAPSULE ORAL at 21:58

## 2023-10-28 RX ADMIN — HEPARIN SODIUM 5000 UNIT(S): 5000 INJECTION INTRAVENOUS; SUBCUTANEOUS at 17:52

## 2023-10-28 RX ADMIN — Medication 975 MILLIGRAM(S): at 07:39

## 2023-10-28 RX ADMIN — Medication 975 MILLIGRAM(S): at 17:51

## 2023-10-28 RX ADMIN — Medication 975 MILLIGRAM(S): at 12:40

## 2023-10-28 RX ADMIN — Medication 600 MILLIGRAM(S): at 12:40

## 2023-10-28 RX ADMIN — Medication 600 MILLIGRAM(S): at 07:39

## 2023-10-28 RX ADMIN — SACUBITRIL AND VALSARTAN 1 TABLET(S): 24; 26 TABLET, FILM COATED ORAL at 17:52

## 2023-10-28 RX ADMIN — ATORVASTATIN CALCIUM 80 MILLIGRAM(S): 80 TABLET, FILM COATED ORAL at 21:58

## 2023-10-28 NOTE — PATIENT PROFILE ADULT - HAS THE PATIENT RECEIVED THE INFLUENZA VACCINE THIS SEASON?
"Patient: Linsey Hopper    Procedure Summary     Date:  03/13/20 Room / Location:  [facility] OR [address] [place] [address] MAIN OR    Anesthesia Start:  0730 Anesthesia Stop:  0828    Procedure:  DILATATION AND CURETTAGE HYSTEROSCOPY NOVASURE ENDOMETRIAL ABLATION (N/A Vagina) Diagnosis:       Menorrhagia with regular cycle      (Menorrhagia with regular cycle [N92.0])    Surgeon:  Katheryn Hooper MD Provider:  Jason Duncan MD    Anesthesia Type:  general ASA Status:  2          Anesthesia Type: general    Vitals  Vitals Value Taken Time   /79 3/13/2020  9:15 AM   Temp 36.7 °C (98 °F) 3/13/2020  9:15 AM   Pulse 87 3/13/2020  9:15 AM   Resp 16 3/13/2020  9:15 AM   SpO2 99 % 3/13/2020  9:16 AM   Vitals shown include unvalidated device data.        Post Anesthesia Care and Evaluation    Patient location during evaluation: PACU  Patient participation: complete - patient participated  Level of consciousness: awake and alert  Pain management: adequate  Airway patency: patent  Anesthetic complications: No anesthetic complications    Cardiovascular status: acceptable  Respiratory status: acceptable  Hydration status: acceptable    Comments: /67   Pulse 74   Temp 36.7 °C (98 °F) (Oral)   Resp 16   Ht 157.5 cm (62\")   Wt 70.1 kg (154 lb 8 oz)   LMP 02/25/2020   SpO2 99%   BMI 28.26 kg/m²         " no...

## 2023-10-28 NOTE — PATIENT PROFILE ADULT - INTERNATIONAL TRAVEL
Assessment/Plan:  Patient generally appears to be doing well  No significant concerns or complaints today  Recommend return to office for re-evaluation in 4-6 months  No problem-specific Assessment & Plan notes found for this encounter  Diagnoses and all orders for this visit:    Paroxysmal atrial fibrillation ([address] )    Type 2 diabetes mellitus without complication, without long-term current use of insulin (HCC)  -     POCT hemoglobin A1c  -     rosuvastatin (CRESTOR) 40 MG tablet; Take 1 tablet (40 mg total) by mouth daily for 90 days    Essential hypertension  -     furosemide (LASIX) 40 mg tablet; Take 1 tablet (40 mg total) by mouth daily  -     potassium chloride (MICRO-K) 10 MEQ CR capsule; Take 1 capsule (10 mEq total) by mouth daily  -     losartan (COZAAR) 50 mg tablet; Take 1 tablet (50 mg total) by mouth daily    Other orders  -     Discontinue: rosuvastatin (CRESTOR) 40 MG tablet; Take 40 mg by mouth daily          Subjective:      Patient ID: Haider Raya  is a 81 y o  male  Patient is here for recheck on chronic conditions  He is generally feeling well  He needs refills on some medications  He is due for A1c  Denies any chest pain or shortness of breath  No orthopnea  No GI complaints  The following portions of the patient's history were reviewed and updated as appropriate: allergies, current medications, past family history, past medical history, past social history, past surgical history and problem list     Review of Systems   Constitutional: Negative  HENT: Negative  Eyes: Negative  Respiratory: Negative  Cardiovascular: Negative  Gastrointestinal: Negative  Endocrine: Negative  Genitourinary: Negative  Musculoskeletal: Negative  Skin: Negative  Allergic/Immunologic: Negative  Neurological: Negative  Hematological: Negative  Psychiatric/Behavioral: Negative            Objective:      /60 (BP Location: Left arm, Patient Position: Sitting, Cuff Size: Standard)   Pulse 87   Temp 97 6 °F (36 4 °C) (Tympanic)   Ht 5' 3 19" (1 605 m)   Wt 68 kg (150 lb)   SpO2 98%   BMI 26 41 kg/m²          Physical Exam   Constitutional: He is oriented to person, place, and time  He appears well-developed and well-nourished  HENT:   Head: Normocephalic and atraumatic  Right Ear: External ear normal  Tympanic membrane is not erythematous and not bulging  Left Ear: External ear normal  Tympanic membrane is not erythematous and not bulging  Nose: Nose normal    Mouth/Throat: Oropharynx is clear and moist and mucous membranes are normal  No oral lesions  No oropharyngeal exudate  Eyes: Conjunctivae and EOM are normal  Right eye exhibits no discharge  Left eye exhibits no discharge  No scleral icterus  Neck: Normal range of motion  Neck supple  No thyromegaly present  Cardiovascular: Normal rate, regular rhythm and normal heart sounds  Exam reveals no gallop and no friction rub  No murmur heard  Pulmonary/Chest: Effort normal  No respiratory distress  He has no wheezes  He has no rales  He exhibits no tenderness  Abdominal: Soft  Bowel sounds are normal  He exhibits no distension and no mass  There is no tenderness  There is no rebound and no guarding  Musculoskeletal: Normal range of motion  He exhibits no edema, tenderness or deformity  Lymphadenopathy:     He has no cervical adenopathy  Neurological: He is alert and oriented to person, place, and time  He has normal reflexes  No cranial nerve deficit  He exhibits normal muscle tone  Coordination normal    Skin: Skin is warm and dry  No rash noted  No erythema  No pallor  Psychiatric: He has a normal mood and affect  His behavior is normal    Vitals reviewed  No

## 2023-10-28 NOTE — PATIENT PROFILE ADULT - FALL HARM RISK - HARM RISK INTERVENTIONS

## 2023-10-28 NOTE — PROGRESS NOTE ADULT - ASSESSMENT
Pt is a 69 y/o male POD1 s/p cysto w/ TURP. Urine clear this AM - CBI stopped @ 11:30.   - Will re-eval in a few hours off CBI - if no reoccurrence of hematuria, can likely plan for d/c home later today with boo to leg bag   - Ibuprofen and tylenol for pain  - Regular diet as tolerated  - DVT ppx w/ SCDs & SQH  - Encourage ambulation

## 2023-10-28 NOTE — PATIENT PROFILE ADULT - NSPROMEDSADMININFO_GEN_A_NUR
no concerns Check here if all serologies below were negative, non-reactive or immune. Otherwise select appropriate status.

## 2023-10-29 ENCOUNTER — TRANSCRIPTION ENCOUNTER (OUTPATIENT)
Age: 68
End: 2023-10-29

## 2023-10-29 VITALS
SYSTOLIC BLOOD PRESSURE: 152 MMHG | TEMPERATURE: 98 F | DIASTOLIC BLOOD PRESSURE: 75 MMHG | OXYGEN SATURATION: 95 % | RESPIRATION RATE: 18 BRPM | HEART RATE: 66 BPM

## 2023-10-29 RX ORDER — IBUPROFEN 200 MG
1 TABLET ORAL
Qty: 0 | Refills: 0 | DISCHARGE
Start: 2023-10-29

## 2023-10-29 RX ORDER — ACETAMINOPHEN 500 MG
3 TABLET ORAL
Qty: 0 | Refills: 0 | DISCHARGE
Start: 2023-10-29

## 2023-10-29 RX ADMIN — Medication 600 MILLIGRAM(S): at 00:30

## 2023-10-29 RX ADMIN — Medication 600 MILLIGRAM(S): at 13:01

## 2023-10-29 RX ADMIN — Medication 81 MILLIGRAM(S): at 12:09

## 2023-10-29 RX ADMIN — CARVEDILOL PHOSPHATE 6.25 MILLIGRAM(S): 80 CAPSULE, EXTENDED RELEASE ORAL at 05:59

## 2023-10-29 RX ADMIN — Medication 975 MILLIGRAM(S): at 05:59

## 2023-10-29 RX ADMIN — Medication 975 MILLIGRAM(S): at 06:32

## 2023-10-29 RX ADMIN — Medication 975 MILLIGRAM(S): at 00:15

## 2023-10-29 RX ADMIN — HEPARIN SODIUM 5000 UNIT(S): 5000 INJECTION INTRAVENOUS; SUBCUTANEOUS at 06:00

## 2023-10-29 RX ADMIN — Medication 975 MILLIGRAM(S): at 12:09

## 2023-10-29 RX ADMIN — Medication 600 MILLIGRAM(S): at 12:09

## 2023-10-29 RX ADMIN — SACUBITRIL AND VALSARTAN 1 TABLET(S): 24; 26 TABLET, FILM COATED ORAL at 05:59

## 2023-10-29 RX ADMIN — Medication 600 MILLIGRAM(S): at 05:59

## 2023-10-29 RX ADMIN — Medication 975 MILLIGRAM(S): at 00:30

## 2023-10-29 RX ADMIN — Medication 600 MILLIGRAM(S): at 00:15

## 2023-10-29 RX ADMIN — Medication 600 MILLIGRAM(S): at 06:32

## 2023-10-29 RX ADMIN — Medication 975 MILLIGRAM(S): at 13:01

## 2023-10-29 NOTE — PROGRESS NOTE ADULT - SUBJECTIVE AND OBJECTIVE BOX
SUBJECTIVE / 24H EVENTS: Pt seen and examined at bedside with Dr. Ghotra. He reports feeling well today. CBI running @ slow rate & urine is clear. Pt states he has some discomfort from boo catheter but has no other complaints at this time. Reports tolerating diet w/o difficulty. Denies fever or chills, CP or SOB.     MEDICATIONS  (STANDING):  acetaminophen     Tablet .. 975 milliGRAM(s) Oral every 6 hours  aspirin enteric coated 81 milliGRAM(s) Oral daily  atorvastatin 80 milliGRAM(s) Oral at bedtime  carvedilol 6.25 milliGRAM(s) Oral every 12 hours  heparin   Injectable 5000 Unit(s) SubCutaneous every 12 hours  ibuprofen  Tablet. 600 milliGRAM(s) Oral every 6 hours  sacubitril 24 mG/valsartan 26 mG 1 Tablet(s) Oral two times a day  tamsulosin 0.4 milliGRAM(s) Oral at bedtime    MEDICATIONS  (PRN):  meclizine 12.5 milliGRAM(s) Oral daily PRN for dizziness      Vital Signs Last 24 Hrs  T(C): 36.8 (28 Oct 2023 12:52), Max: 36.8 (28 Oct 2023 12:52)  T(F): 98.2 (28 Oct 2023 12:52), Max: 98.2 (28 Oct 2023 12:52)  HR: 57 (28 Oct 2023 12:52) (54 - 72)  BP: 136/69 (28 Oct 2023 12:52) (115/86 - 161/90)  BP(mean): 90 (27 Oct 2023 21:06) (74 - 97)  RR: 18 (28 Oct 2023 12:52) (12 - 18)  SpO2: 93% (28 Oct 2023 12:52) (93% - 100%)    Parameters below as of 28 Oct 2023 12:52  Patient On (Oxygen Delivery Method): room air        Constitutional: patient resting comfortably in bed, in no acute distress  Respiratory: respirations are unlabored, no accessory muscle use, no conversational dyspnea  Cardiovascular: regular rate & rhythm  Gastrointestinal: abdomen soft, non-tender, non-distended, no rebound tenderness / guarding  : 3-way indwelling boo cath in place, urine in tubing & collection bag is clear, yellow, no hematuria or clots. No suprapubic distension or tenderness  Neurological: A&O x 3  Skin: mucous membranes moist, no diaphoresis, pallor, cyanosis or jaundice      I&O's Detail    27 Oct 2023 07:01  -  28 Oct 2023 07:00  --------------------------------------------------------  IN:    Continuous Bladder Irrigation (mL): 27849 mL  Total IN: 44222 mL    OUT:    Blood Loss (mL): 50 mL    Continuous Bladder Irrigation (mL): 65922 mL  Total OUT: 04483 mL    Total NET: 2500 mL      28 Oct 2023 07:01  -  28 Oct 2023 13:06  --------------------------------------------------------  IN:    Continuous Bladder Irrigation (mL): 7300 mL    Oral Fluid: 240 mL  Total IN: 7540 mL    OUT:    Continuous Bladder Irrigation (mL): 8325 mL  Total OUT: 8325 mL    Total NET: -785 mL          LABS:                        10.4   6.68  )-----------( 209      ( 28 Oct 2023 05:50 )             31.9     10-28    139  |  106  |  14.0  ----------------------------<  100<H>  3.6   |  27.0  |  0.76    Ca    8.5      28 Oct 2023 05:50        Urinalysis Basic - ( 28 Oct 2023 05:50 )    Color: x / Appearance: x / SG: x / pH: x  Gluc: 100 mg/dL / Ketone: x  / Bili: x / Urobili: x   Blood: x / Protein: x / Nitrite: x   Leuk Esterase: x / RBC: x / WBC x   Sq Epi: x / Non Sq Epi: x / Bacteria: x      
SUBJECTIVE / 24H EVENTS: Patient seen and examined at bedside. Yesterday after CBI was turned off pt had reoccurrence of hematuria and therefore, discharge was postponed. He was placed back on CBI and on eval this morning, urine again clear. Patient has no complaints at time of my exam. He reports no issues with the boo catheter overnight. Tolerating diet, OOB ambulating. Denies fever or chills, CP or SOB.    MEDICATIONS  (STANDING):  acetaminophen     Tablet .. 975 milliGRAM(s) Oral every 6 hours  aspirin enteric coated 81 milliGRAM(s) Oral daily  atorvastatin 80 milliGRAM(s) Oral at bedtime  carvedilol 6.25 milliGRAM(s) Oral every 12 hours  heparin   Injectable 5000 Unit(s) SubCutaneous every 12 hours  ibuprofen  Tablet. 600 milliGRAM(s) Oral every 6 hours  sacubitril 24 mG/valsartan 26 mG 1 Tablet(s) Oral two times a day  tamsulosin 0.4 milliGRAM(s) Oral at bedtime    MEDICATIONS  (PRN):  meclizine 12.5 milliGRAM(s) Oral daily PRN for dizziness      Vital Signs Last 24 Hrs  T(C): 36.7 (29 Oct 2023 05:20), Max: 36.9 (28 Oct 2023 22:06)  T(F): 98 (29 Oct 2023 05:20), Max: 98.4 (28 Oct 2023 22:06)  HR: 63 (29 Oct 2023 05:20) (57 - 76)  BP: 132/70 (29 Oct 2023 05:20) (122/56 - 150/77)  BP(mean): --  RR: 18 (29 Oct 2023 05:20) (18 - 18)  SpO2: 91% (29 Oct 2023 05:20) (91% - 94%)    Parameters below as of 29 Oct 2023 05:20  Patient On (Oxygen Delivery Method): room air        Constitutional: patient appears comfortable sitting up in bed, in no apparent distress  Respiratory: respirations are unlabored, no accessory muscle use, no conversational dyspnea  Cardiovascular: regular rate & rhythm  Gastrointestinal: abdomen soft, non-tender, non-distended, no rebound tenderness / guarding  : 3-way indwelling boo cath in place, CBI running @ low rate, urine clear. CBI stopped No suprapubic distention or tenderness.   Neurological: A&O x 3  Skin: mucous membranes moist, no diaphoresis, pallor, cyanosis or jaundice      I&O's Detail    28 Oct 2023 07:01  -  29 Oct 2023 07:00  --------------------------------------------------------  IN:    Continuous Bladder Irrigation (mL): 13013 mL    Oral Fluid: 240 mL  Total IN: 86128 mL    OUT:    Continuous Bladder Irrigation (mL): 54862 mL  Total OUT: 88464 mL    Total NET: 2590 mL      29 Oct 2023 07:01  -  29 Oct 2023 09:38  --------------------------------------------------------  IN:  Total IN: 0 mL    OUT:    Voided (mL): 950 mL  Total OUT: 950 mL    Total NET: -950 mL          LABS:                        10.4   6.68  )-----------( 209      ( 28 Oct 2023 05:50 )             31.9     10-28    139  |  106  |  14.0  ----------------------------<  100<H>  3.6   |  27.0  |  0.76    Ca    8.5      28 Oct 2023 05:50        Urinalysis Basic - ( 28 Oct 2023 05:50 )    Color: x / Appearance: x / SG: x / pH: x  Gluc: 100 mg/dL / Ketone: x  / Bili: x / Urobili: x   Blood: x / Protein: x / Nitrite: x   Leuk Esterase: x / RBC: x / WBC x   Sq Epi: x / Non Sq Epi: x / Bacteria: x

## 2023-10-29 NOTE — DISCHARGE NOTE PROVIDER - CARE PROVIDER_API CALL
Rodney Liz  Urology  [address] [place] [address]  Phone: [phone]  Fax: [phone]  Follow Up Time: 1-3 days

## 2023-10-29 NOTE — DISCHARGE NOTE NURSING/CASE MANAGEMENT/SOCIAL WORK - NSDCPEFALRISK_GEN_ALL_CORE
For information on Fall & Injury Prevention, visit: [email or web address] OR  [email or web address] OR  https://www.cdc.gov/steadi/patient.html

## 2023-10-29 NOTE — DISCHARGE NOTE PROVIDER - HOSPITAL COURSE
Patient is a 67 y/o male who was taken to the OR on 10/27 with Dr. Liz for a cysto & TURP. Pt tolerated procedure well. He remained on CBI post-op. POD#1 urine was clear and CBI was turned off, however with CBI off pt had reoccurrence of hematuria. CBI restarted. Urine cleared up on POD#2. CBI stopped again and urine remains clear with only very slight blood tinge. He will be discharged home w/ boo catheter & instructions to follow-up with Dr. Liz this week for further catheter management. Pt aware of plan and agreeable. Tolerating diet, pain controlled on PO medications, & stable for d/c.    Patient is advised to RETURN TO THE EMERGENCY DEPARTMENT for any of the following - worsening pain, fever/chills, nausea/vomiting, altered mental status, chest pain, shortness of breath, or any other new / worsening symptom.

## 2023-10-29 NOTE — DISCHARGE NOTE PROVIDER - NSDCCPCAREPLAN_GEN_ALL_CORE_FT
PRINCIPAL DISCHARGE DIAGNOSIS  Diagnosis: Retention of urine  Assessment and Plan of Treatment: - FOLLOW UP: Please call and make an appointment to be seen by Dr. Liz within the week after discharge. Also, please call and make an appointment with your primary care physician as per your usual schedule.   - ACTIVITY: May return to normal activities as tolerated.  - DIET: May continue regular diet.  - MEDICATIONS: Please resume all home medications as previously prescribed. You can take over-the-counter tylenol and/or ibuprofen for pain relief.   - Boo Care: Boo does not need to be flushed. 3rd port of boo catheter should remain capped. Empty as needed.    Patient is advised to RETURN TO THE EMERGENCY DEPARTMENT for any of the following - worsening pain, fever/chills, nausea/vomiting, altered mental status, chest pain, shortness of breath, or any other new / worsening symptom.

## 2023-10-29 NOTE — DISCHARGE NOTE NURSING/CASE MANAGEMENT/SOCIAL WORK - PATIENT PORTAL LINK FT
You can access the FollowMyHealth Patient Portal offered by [facility] by registering at the following website: [email or web address]. By joining [facility]’s FollowMyHealth portal, you will also be able to view your health information using other applications (apps) compatible with our system.

## 2023-10-29 NOTE — DISCHARGE NOTE PROVIDER - NSDCMRMEDTOKEN_GEN_ALL_CORE_FT
acetaminophen 325 mg oral tablet: 3 tab(s) orally every 6 hours  aspirin 81 mg oral delayed release tablet: 1 tab(s) orally once a day  atorvastatin 80 mg oral tablet: 1 tab(s) orally once a day (at bedtime) MDD: 1  carvedilol 6.25 mg oral tablet: 1 tab(s) orally every 12 hours  Entresto 24 mg-26 mg oral tablet: 1 tab(s) orally 2 times a day  ibuprofen 600 mg oral tablet: 1 tab(s) orally every 6 hours  meclizine 12.5 mg oral tablet: 1 tab(s) orally as needed for  dizziness  tamsulosin 0.4 mg oral capsule: 1 cap(s) orally once a day (at bedtime)

## 2023-10-29 NOTE — PROGRESS NOTE ADULT - ASSESSMENT
Patient is a 69 y/o male POD#2 s/p cysto with TURP. Had hematuria after CBI stopped yesterday. CBI re-initiated yesterday afternoon and urine again clear.   - CBI stopped by myself @ 930 this AM  - Will re-eval in a few hours to see if urine remains clear  - Plan will be for d/c home with boo catheter  - Continue Regular diet  - Tylenol & Ibuprofen for pain, PRN  - Possible discharge home today pending re-eval this afternoon off CBI

## 2023-10-30 PROBLEM — I63.9 CEREBRAL INFARCTION, UNSPECIFIED: Chronic | Status: ACTIVE | Noted: 2023-10-13

## 2023-10-30 PROBLEM — Z86.79 PERSONAL HISTORY OF OTHER DISEASES OF THE CIRCULATORY SYSTEM: Chronic | Status: ACTIVE | Noted: 2023-10-13

## 2023-10-31 PROBLEM — Z91.89 OTHER SPECIFIED PERSONAL RISK FACTORS, NOT ELSEWHERE CLASSIFIED: Chronic | Status: ACTIVE | Noted: 2023-10-13

## 2023-10-31 PROBLEM — R73.03 PREDIABETES: Chronic | Status: ACTIVE | Noted: 2023-10-13

## 2023-10-31 PROBLEM — N17.9 ACUTE KIDNEY FAILURE, UNSPECIFIED: Chronic | Status: ACTIVE | Noted: 2023-10-13

## 2023-10-31 PROBLEM — I50.22 CHRONIC SYSTOLIC (CONGESTIVE) HEART FAILURE: Chronic | Status: ACTIVE | Noted: 2023-10-13

## 2023-11-02 ENCOUNTER — APPOINTMENT (OUTPATIENT)
Dept: UROLOGY | Facility: CLINIC | Age: 68
End: 2023-11-02
Payer: MEDICARE

## 2023-11-02 VITALS — DIASTOLIC BLOOD PRESSURE: 96 MMHG | HEART RATE: 73 BPM | SYSTOLIC BLOOD PRESSURE: 137 MMHG

## 2023-11-02 PROCEDURE — 51700 IRRIGATION OF BLADDER: CPT | Mod: 58

## 2023-11-02 PROCEDURE — A4216: CPT | Mod: NC

## 2023-11-06 LAB
SURGICAL PATHOLOGY STUDY: SIGNIFICANT CHANGE UP
SURGICAL PATHOLOGY STUDY: SIGNIFICANT CHANGE UP

## 2023-11-13 PROCEDURE — 80048 BASIC METABOLIC PNL TOTAL CA: CPT

## 2023-11-13 PROCEDURE — 36415 COLL VENOUS BLD VENIPUNCTURE: CPT

## 2023-11-13 PROCEDURE — 85027 COMPLETE CBC AUTOMATED: CPT

## 2023-11-13 PROCEDURE — 88305 TISSUE EXAM BY PATHOLOGIST: CPT

## 2023-11-16 ENCOUNTER — APPOINTMENT (OUTPATIENT)
Dept: UROLOGY | Facility: CLINIC | Age: 68
End: 2023-11-16
Payer: MEDICARE

## 2023-11-16 DIAGNOSIS — Z87.898 PERSONAL HISTORY OF OTHER SPECIFIED CONDITIONS: ICD-10-CM

## 2023-11-16 DIAGNOSIS — N40.1 BENIGN PROSTATIC HYPERPLASIA WITH LOWER URINARY TRACT SYMPMS: ICD-10-CM

## 2023-11-16 LAB
BILIRUB UR QL STRIP: NORMAL
CLARITY UR: NORMAL
COLLECTION METHOD: NORMAL
GLUCOSE UR-MCNC: NORMAL
HCG UR QL: 0.2 EU/DL
HGB UR QL STRIP.AUTO: ABNORMAL
KETONES UR-MCNC: NORMAL
LEUKOCYTE ESTERASE UR QL STRIP: ABNORMAL
NITRITE UR QL STRIP: POSITIVE
PH UR STRIP: 6
PROT UR STRIP-MCNC: ABNORMAL
SP GR UR STRIP: 1.02

## 2023-11-16 PROCEDURE — 81003 URINALYSIS AUTO W/O SCOPE: CPT | Mod: QW

## 2023-11-16 PROCEDURE — 51798 US URINE CAPACITY MEASURE: CPT

## 2023-11-16 PROCEDURE — 99024 POSTOP FOLLOW-UP VISIT: CPT

## 2023-11-16 RX ORDER — CEFDINIR 300 MG/1
300 CAPSULE ORAL TWICE DAILY
Qty: 10 | Refills: 0 | Status: DISCONTINUED | COMMUNITY
Start: 2023-11-02 | End: 2023-11-16

## 2023-11-16 RX ORDER — SACUBITRIL AND VALSARTAN 24; 26 MG/1; MG/1
24-26 TABLET, FILM COATED ORAL TWICE DAILY
Qty: 30 | Refills: 0 | Status: ACTIVE | COMMUNITY
Start: 2023-11-16 | End: 1900-01-01

## 2023-11-16 RX ORDER — LOSARTAN POTASSIUM 50 MG/1
50 TABLET, FILM COATED ORAL DAILY
Qty: 30 | Refills: 0 | Status: DISCONTINUED | COMMUNITY
Start: 2023-09-13 | End: 2023-11-16

## 2023-11-16 RX ORDER — TAMSULOSIN HYDROCHLORIDE 0.4 MG/1
0.4 CAPSULE ORAL
Qty: 30 | Refills: 1 | Status: DISCONTINUED | COMMUNITY
Start: 2023-09-13 | End: 2023-11-16

## 2023-11-16 RX ORDER — GENTAMICIN SULFATE 40 MG/ML
40 INJECTION, SOLUTION INTRAMUSCULAR; INTRAVENOUS
Qty: 2 | Refills: 0 | Status: DISCONTINUED | COMMUNITY
Start: 2023-09-21 | End: 2023-11-16

## 2023-11-16 RX ORDER — AMOXICILLIN AND CLAVULANATE POTASSIUM 875; 125 MG/1; MG/1
875-125 TABLET, COATED ORAL TWICE DAILY
Qty: 14 | Refills: 0 | Status: DISCONTINUED | COMMUNITY
Start: 2023-10-17 | End: 2023-11-16

## 2023-11-21 LAB — BACTERIA UR CULT: ABNORMAL

## 2023-12-05 ENCOUNTER — RX RENEWAL (OUTPATIENT)
Age: 68
End: 2023-12-05

## 2023-12-19 ENCOUNTER — APPOINTMENT (OUTPATIENT)
Dept: UROLOGY | Facility: CLINIC | Age: 68
End: 2023-12-19
Payer: MEDICARE

## 2023-12-19 VITALS
DIASTOLIC BLOOD PRESSURE: 70 MMHG | HEART RATE: 73 BPM | SYSTOLIC BLOOD PRESSURE: 147 MMHG | BODY MASS INDEX: 24.64 KG/M2 | WEIGHT: 157 LBS | HEIGHT: 67 IN

## 2023-12-19 DIAGNOSIS — N39.0 URINARY TRACT INFECTION, SITE NOT SPECIFIED: ICD-10-CM

## 2023-12-19 LAB
BILIRUB UR QL STRIP: NORMAL
CLARITY UR: CLEAR
COLLECTION METHOD: NORMAL
GLUCOSE UR-MCNC: ABNORMAL
HCG UR QL: 0.2 EU/DL
HGB UR QL STRIP.AUTO: ABNORMAL
KETONES UR-MCNC: NORMAL
LEUKOCYTE ESTERASE UR QL STRIP: ABNORMAL
NITRITE UR QL STRIP: POSITIVE
PH UR STRIP: 5.5
PROT UR STRIP-MCNC: ABNORMAL
SP GR UR STRIP: 1

## 2023-12-19 PROCEDURE — 99213 OFFICE O/P EST LOW 20 MIN: CPT | Mod: 24

## 2023-12-19 PROCEDURE — 51798 US URINE CAPACITY MEASURE: CPT

## 2023-12-19 PROCEDURE — 81003 URINALYSIS AUTO W/O SCOPE: CPT | Mod: QW

## 2023-12-19 RX ORDER — DAPAGLIFLOZIN 5 MG/1
5 TABLET, FILM COATED ORAL DAILY
Qty: 30 | Refills: 0 | Status: ACTIVE | COMMUNITY
Start: 2023-12-19

## 2023-12-19 RX ORDER — SULFAMETHOXAZOLE AND TRIMETHOPRIM 800; 160 MG/1; MG/1
800-160 TABLET ORAL TWICE DAILY
Qty: 14 | Refills: 0 | Status: DISCONTINUED | COMMUNITY
Start: 2023-11-21 | End: 2023-12-19

## 2023-12-19 NOTE — PHYSICAL EXAM
[General Appearance - Well Developed] : well developed [General Appearance - Well Nourished] : well nourished [Edema] : no peripheral edema [] : no respiratory distress [Normal Station and Gait] : the gait and station were normal for the patient's age [Skin Color & Pigmentation] : normal skin color and pigmentation [Oriented To Time, Place, And Person] : oriented to person, place, and time [Not Anxious] : not anxious

## 2023-12-19 NOTE — LETTER BODY
[Dear  ___] : Dear  [unfilled], [Courtesy Letter:] : I had the pleasure of seeing your patient, [unfilled], in my office today. [Please see my note below.] : Please see my note below. [Sincerely,] : Sincerely, [FreeTextEntry3] : Ed  Rodney Liz MD [facility]  of [facility] [place] and [employer] at [facility]

## 2023-12-19 NOTE — ASSESSMENT
[FreeTextEntry1] : Impression:  BPH UTI   Plan:  restart tamsulsoin given elevated PVR urine culture.  4 week follow up .

## 2023-12-23 LAB — BACTERIA UR CULT: ABNORMAL

## 2024-01-09 ENCOUNTER — RX RENEWAL (OUTPATIENT)
Age: 69
End: 2024-01-09

## 2024-01-16 ENCOUNTER — APPOINTMENT (OUTPATIENT)
Dept: UROLOGY | Facility: CLINIC | Age: 69
End: 2024-01-16
Payer: MEDICARE

## 2024-01-16 VITALS
DIASTOLIC BLOOD PRESSURE: 73 MMHG | WEIGHT: 157 LBS | HEART RATE: 78 BPM | SYSTOLIC BLOOD PRESSURE: 125 MMHG | HEIGHT: 67 IN | BODY MASS INDEX: 24.64 KG/M2

## 2024-01-16 LAB
BILIRUB UR QL STRIP: NORMAL
CLARITY UR: NORMAL
COLLECTION METHOD: NORMAL
GLUCOSE UR-MCNC: ABNORMAL
HCG UR QL: 0.2 EU/DL
HGB UR QL STRIP.AUTO: ABNORMAL
KETONES UR-MCNC: NORMAL
LEUKOCYTE ESTERASE UR QL STRIP: ABNORMAL
NITRITE UR QL STRIP: NORMAL
PH UR STRIP: 6.5
PROT UR STRIP-MCNC: NORMAL
SP GR UR STRIP: 1.01

## 2024-01-16 PROCEDURE — 99213 OFFICE O/P EST LOW 20 MIN: CPT | Mod: 24

## 2024-01-16 PROCEDURE — 81003 URINALYSIS AUTO W/O SCOPE: CPT | Mod: QW

## 2024-01-16 RX ORDER — CEFDINIR 300 MG/1
300 CAPSULE ORAL TWICE DAILY
Qty: 14 | Refills: 0 | Status: DISCONTINUED | COMMUNITY
Start: 2023-12-23 | End: 2024-01-16

## 2024-01-16 NOTE — HISTORY OF PRESENT ILLNESS
[FreeTextEntry1] : patient has been having recurrent  UTI is on Farxiga. glucosuria. as expected.. no urgency. no flank pain.  Is having some frequency with a good stream. no dribbling. nocturia X 1-2.

## 2024-01-16 NOTE — ASSESSMENT
[FreeTextEntry1] : impression recurrent UTI, suspect it is due to farxiga.  BPH, good stream,   Plan" stop  tamsulosin urine culture I have asked him to contact his cardiologist to see if he can stop the farxiga and switch to an alternative.  follow up one month.

## 2024-01-16 NOTE — PHYSICAL EXAM
[General Appearance - Well Developed] : well developed [General Appearance - Well Nourished] : well nourished [Normal Appearance] : normal appearance [] : no respiratory distress [Normal Station and Gait] : the gait and station were normal for the patient's age [Skin Color & Pigmentation] : normal skin color and pigmentation [Oriented To Time, Place, And Person] : oriented to person, place, and time [Affect] : the affect was normal [Not Anxious] : not anxious

## 2024-01-19 DIAGNOSIS — N39.0 URINARY TRACT INFECTION, SITE NOT SPECIFIED: ICD-10-CM

## 2024-01-19 LAB — BACTERIA UR CULT: ABNORMAL

## 2024-02-27 ENCOUNTER — APPOINTMENT (OUTPATIENT)
Dept: UROLOGY | Facility: CLINIC | Age: 69
End: 2024-02-27
Payer: MEDICARE

## 2024-02-27 PROCEDURE — 99213 OFFICE O/P EST LOW 20 MIN: CPT

## 2024-02-27 RX ORDER — SULFAMETHOXAZOLE AND TRIMETHOPRIM 800; 160 MG/1; MG/1
800-160 TABLET ORAL TWICE DAILY
Qty: 14 | Refills: 0 | Status: DISCONTINUED | COMMUNITY
Start: 2024-01-19 | End: 2024-02-27

## 2024-02-27 NOTE — ASSESSMENT
[FreeTextEntry1] : Impressin:  recurrent UTI  Plan:  followu up in three months.  UA on follow up  PVR on follow up .

## 2024-02-27 NOTE — PHYSICAL EXAM
[General Appearance - Well Developed] : well developed [] : no respiratory distress [Normal Station and Gait] : the gait and station were normal for the patient's age [Oriented To Time, Place, And Person] : oriented to person, place, and time [Skin Color & Pigmentation] : normal skin color and pigmentation

## 2024-02-27 NOTE — HISTORY OF PRESENT ILLNESS
[FreeTextEntry1] : Has had recurrent UTI. no hematuria occasional ache at tip of penis.  His cardiologist, Dr. Mendes, prefers to keep the patient on Farxiga if at all possible due to the cardiovascular benefits.   The patient has noted only once per night nocturia.  FOS is variable but he feels empty after voiding.

## 2024-02-27 NOTE — LETTER BODY
[Dear  ___] : Dear  [unfilled], [Courtesy Letter:] : I had the pleasure of seeing your patient, [unfilled], in my office today. [Please see my note below.] : Please see my note below. [Sincerely,] : Sincerely, [FreeTextEntry3] : Ed  Rodney Liz MD [facility]  [facility] [place] [facility] [employer] at [facility]

## 2024-04-01 ENCOUNTER — RX RENEWAL (OUTPATIENT)
Age: 69
End: 2024-04-01

## 2024-05-28 ENCOUNTER — APPOINTMENT (OUTPATIENT)
Dept: UROLOGY | Facility: CLINIC | Age: 69
End: 2024-05-28

## 2024-06-24 ENCOUNTER — RX RENEWAL (OUTPATIENT)
Age: 69
End: 2024-06-24

## 2024-06-24 RX ORDER — TAMSULOSIN HYDROCHLORIDE 0.4 MG/1
0.4 CAPSULE ORAL
Qty: 90 | Refills: 0 | Status: ACTIVE | COMMUNITY
Start: 2023-12-19 | End: 1900-01-01

## (undated) DEVICE — DRAPE TOWEL BLUE 17" X 24"

## (undated) DEVICE — ELCTR PLASMA LOOP MEDIUM ANGLED 24FR 12-30 DEG

## (undated) DEVICE — SOL IRR BAG NS 0.9% 3000ML

## (undated) DEVICE — UROVAC

## (undated) DEVICE — TUBING IRR SET FOR CYSTOSCOPY 77"

## (undated) DEVICE — VENODYNE/SCD SLEEVE CALF MEDIUM

## (undated) DEVICE — GLV 8 PROTEXIS (WHITE)

## (undated) DEVICE — GOWN XXL

## (undated) DEVICE — Device

## (undated) DEVICE — FOLEY CATH 3-WAY 24FR 30CC LATEX LUBRICATH

## (undated) DEVICE — WARMING BLANKET UPPER ADULT

## (undated) DEVICE — PACK CYSTOSCOPY TIBURON

## (undated) DEVICE — ELCTR PLASMA BUTTON OVAL 24FR 12-30 DEG

## (undated) DEVICE — SYR CATH TIP 2 OZ

## (undated) DEVICE — TUBING TUR 2 PRONG